# Patient Record
Sex: MALE | Race: WHITE | Employment: FULL TIME | URBAN - NONMETROPOLITAN AREA
[De-identification: names, ages, dates, MRNs, and addresses within clinical notes are randomized per-mention and may not be internally consistent; named-entity substitution may affect disease eponyms.]

---

## 2018-06-15 ENCOUNTER — OFFICE VISIT (OUTPATIENT)
Dept: FAMILY MEDICINE CLINIC | Age: 53
End: 2018-06-15
Payer: COMMERCIAL

## 2018-06-15 VITALS
BODY MASS INDEX: 28.14 KG/M2 | DIASTOLIC BLOOD PRESSURE: 74 MMHG | SYSTOLIC BLOOD PRESSURE: 132 MMHG | HEART RATE: 84 BPM | HEIGHT: 69 IN | TEMPERATURE: 98.7 F | WEIGHT: 190 LBS

## 2018-06-15 DIAGNOSIS — K59.09 OTHER CONSTIPATION: ICD-10-CM

## 2018-06-15 DIAGNOSIS — M54.50 ACUTE MIDLINE LOW BACK PAIN WITHOUT SCIATICA: ICD-10-CM

## 2018-06-15 DIAGNOSIS — J30.1 ACUTE SEASONAL ALLERGIC RHINITIS DUE TO POLLEN: Primary | ICD-10-CM

## 2018-06-15 PROCEDURE — G8419 CALC BMI OUT NRM PARAM NOF/U: HCPCS | Performed by: FAMILY MEDICINE

## 2018-06-15 PROCEDURE — G8427 DOCREV CUR MEDS BY ELIG CLIN: HCPCS | Performed by: FAMILY MEDICINE

## 2018-06-15 PROCEDURE — 4004F PT TOBACCO SCREEN RCVD TLK: CPT | Performed by: FAMILY MEDICINE

## 2018-06-15 PROCEDURE — 3017F COLORECTAL CA SCREEN DOC REV: CPT | Performed by: FAMILY MEDICINE

## 2018-06-15 PROCEDURE — 96372 THER/PROPH/DIAG INJ SC/IM: CPT | Performed by: FAMILY MEDICINE

## 2018-06-15 PROCEDURE — 99214 OFFICE O/P EST MOD 30 MIN: CPT | Performed by: FAMILY MEDICINE

## 2018-06-15 RX ORDER — FLUTICASONE PROPIONATE 50 MCG
1 SPRAY, SUSPENSION (ML) NASAL DAILY
Qty: 1 BOTTLE | Refills: 0 | Status: SHIPPED | OUTPATIENT
Start: 2018-06-15 | End: 2021-03-22 | Stop reason: ALTCHOICE

## 2018-06-15 RX ORDER — METHYLPREDNISOLONE ACETATE 80 MG/ML
80 INJECTION, SUSPENSION INTRA-ARTICULAR; INTRALESIONAL; INTRAMUSCULAR; SOFT TISSUE ONCE
Status: COMPLETED | OUTPATIENT
Start: 2018-06-15 | End: 2018-06-15

## 2018-06-15 RX ORDER — TIZANIDINE 4 MG/1
4 TABLET ORAL 3 TIMES DAILY
Qty: 15 TABLET | Refills: 0 | Status: SHIPPED | OUTPATIENT
Start: 2018-06-15 | End: 2018-07-03 | Stop reason: ALTCHOICE

## 2018-06-15 RX ADMIN — METHYLPREDNISOLONE ACETATE 80 MG: 80 INJECTION, SUSPENSION INTRA-ARTICULAR; INTRALESIONAL; INTRAMUSCULAR; SOFT TISSUE at 12:10

## 2018-06-15 ASSESSMENT — ENCOUNTER SYMPTOMS
DIARRHEA: 0
ABDOMINAL PAIN: 0
BACK PAIN: 1
CONSTIPATION: 1

## 2018-06-15 ASSESSMENT — PATIENT HEALTH QUESTIONNAIRE - PHQ9
1. LITTLE INTEREST OR PLEASURE IN DOING THINGS: 0
2. FEELING DOWN, DEPRESSED OR HOPELESS: 0
SUM OF ALL RESPONSES TO PHQ QUESTIONS 1-9: 0
SUM OF ALL RESPONSES TO PHQ9 QUESTIONS 1 & 2: 0

## 2018-07-03 ENCOUNTER — OFFICE VISIT (OUTPATIENT)
Dept: FAMILY MEDICINE CLINIC | Age: 53
End: 2018-07-03
Payer: COMMERCIAL

## 2018-07-03 VITALS
HEART RATE: 76 BPM | HEIGHT: 69 IN | BODY MASS INDEX: 27.16 KG/M2 | SYSTOLIC BLOOD PRESSURE: 116 MMHG | DIASTOLIC BLOOD PRESSURE: 64 MMHG | WEIGHT: 183.4 LBS

## 2018-07-03 DIAGNOSIS — M54.50 ACUTE MIDLINE LOW BACK PAIN WITHOUT SCIATICA: Primary | ICD-10-CM

## 2018-07-03 PROCEDURE — 4004F PT TOBACCO SCREEN RCVD TLK: CPT | Performed by: FAMILY MEDICINE

## 2018-07-03 PROCEDURE — 3017F COLORECTAL CA SCREEN DOC REV: CPT | Performed by: FAMILY MEDICINE

## 2018-07-03 PROCEDURE — G8427 DOCREV CUR MEDS BY ELIG CLIN: HCPCS | Performed by: FAMILY MEDICINE

## 2018-07-03 PROCEDURE — G8419 CALC BMI OUT NRM PARAM NOF/U: HCPCS | Performed by: FAMILY MEDICINE

## 2018-07-03 PROCEDURE — 99212 OFFICE O/P EST SF 10 MIN: CPT | Performed by: FAMILY MEDICINE

## 2018-07-03 NOTE — PROGRESS NOTES
No prescriptions requested or ordered in this encounter       All patient questions answered. Patient voiced understanding. Quality Measures    Body mass index is 27.08 kg/m². Elevated. Weight control planned discussed Healthy diet and regular exercise. BP: 116/64 Blood pressure is normal. Treatment plan consists of No treatment change needed.     No results found for: LDLCALC, LDLCHOLESTEROL, LDLDIRECT (goal LDL reduction with dx if diabetes is 50% LDL reduction)      PHQ Scores 6/15/2018 9/26/2016   PHQ2 Score 0 0   PHQ9 Score 0 0     Interpretation of Total Score Depression Severity: 1-4 = Minimal depression, 5-9 = Mild depression, 10-14 = Moderate depression, 15-19 = Moderately severe depression, 20-27 = Severe depression      Electronically signed by Viola Esteves MD on 7/3/2018 at 10:19 AM

## 2018-07-03 NOTE — PATIENT INSTRUCTIONS
hours. Put a thin cloth between the ice pack and your skin. · Take pain medicines exactly as directed. ¨ If the doctor gave you a prescription medicine for pain, take it as prescribed. ¨ If you are not taking a prescription pain medicine, ask your doctor if you can take an over-the-counter medicine. · Take short walks several times a day. You can start with 5 to 10 minutes, 3 or 4 times a day, and work up to longer walks. Walk on level surfaces and avoid hills and stairs until your back is better. · Return to work and other activities as soon as you can. Continued rest without activity is usually not good for your back. · To prevent future back pain, do exercises to stretch and strengthen your back and stomach. Learn how to use good posture, safe lifting techniques, and proper body mechanics. When should you call for help? Call your doctor now or seek immediate medical care if:    · You have new or worsening numbness in your legs.     · You have new or worsening weakness in your legs. (This could make it hard to stand up.)     · You lose control of your bladder or bowels.    Watch closely for changes in your health, and be sure to contact your doctor if:    · Your pain gets worse.     · You are not getting better after 2 weeks. Where can you learn more? Go to https://Common Ground.AudienceScience. org and sign in to your goBalto account. Enter O469 in the "[employer] box to learn more about \"Back Pain: Care Instructions. \"     If you do not have an account, please click on the \"Sign Up Now\" link. Current as of: November 29, 2017  Content Version: 11.6  © 8124-4044 [employer]. Care instructions adapted under license by [facility] ([facility]). If you have questions about a medical condition or this instruction, always ask your healthcare professional. [address] any warranty or liability for your use of this information.

## 2018-08-27 ENCOUNTER — TELEPHONE (OUTPATIENT)
Dept: FAMILY MEDICINE CLINIC | Age: 53
End: 2018-08-27

## 2021-03-17 ENCOUNTER — TELEPHONE (OUTPATIENT)
Dept: FAMILY MEDICINE CLINIC | Age: 56
End: 2021-03-17

## 2021-03-19 ENCOUNTER — OFFICE VISIT (OUTPATIENT)
Dept: FAMILY MEDICINE CLINIC | Age: 56
End: 2021-03-19
Payer: COMMERCIAL

## 2021-03-19 VITALS
OXYGEN SATURATION: 97 % | TEMPERATURE: 97.6 F | BODY MASS INDEX: 28.23 KG/M2 | HEART RATE: 58 BPM | WEIGHT: 190.6 LBS | HEIGHT: 69 IN | DIASTOLIC BLOOD PRESSURE: 82 MMHG | SYSTOLIC BLOOD PRESSURE: 120 MMHG

## 2021-03-19 DIAGNOSIS — F17.200 SMOKING: ICD-10-CM

## 2021-03-19 DIAGNOSIS — K40.90 LEFT INGUINAL HERNIA: Primary | ICD-10-CM

## 2021-03-19 PROCEDURE — 99213 OFFICE O/P EST LOW 20 MIN: CPT | Performed by: FAMILY MEDICINE

## 2021-03-19 ASSESSMENT — ENCOUNTER SYMPTOMS
DIARRHEA: 0
NAUSEA: 0
BLOOD IN STOOL: 0

## 2021-03-19 ASSESSMENT — PATIENT HEALTH QUESTIONNAIRE - PHQ9
2. FEELING DOWN, DEPRESSED OR HOPELESS: 0
SUM OF ALL RESPONSES TO PHQ9 QUESTIONS 1 & 2: 0
SUM OF ALL RESPONSES TO PHQ QUESTIONS 1-9: 0

## 2021-03-19 NOTE — PROGRESS NOTES
[facility]  [facility] MEDICINE  [address] [facility]  Dept: [phone]  Dept Fax: [phone]  Loc: [phone]  PROGRESS NOTE      Visit Date: 3/19/2021    Josiah Miles is a 60 y.o. male who presents today for:  Chief Complaint   Patient presents with    Hernia     left groin area       Subjective:  HPI    2.5 yr f/u    Left groin bulge. Started in nov 2020. Sometimes has pain. Does a lot of heavy lifting at work. Not seen y anyone else for this. No injury. Hx of right inguinal hernia repair. Works at [employer]. Review of Systems   Constitutional: Negative for chills and fever. Gastrointestinal: Negative for blood in stool, diarrhea and nausea. Genitourinary: Negative for scrotal swelling. There is no problem list on file for this patient. History reviewed. No pertinent past medical history. Past Surgical History:   Procedure Laterality Date    HERNIA REPAIR Right      Family History   Problem Relation Age of Onset   [facility] COPD Mother     Cancer Father         lung     Social History     Tobacco Use    Smoking status: Current Every Day Smoker     Packs/day: 0.75    Smokeless tobacco: Never Used   Substance Use Topics    Alcohol use: Not on file      Current Outpatient Medications   Medication Sig Dispense Refill    fluticasone (FLONASE) 50 MCG/ACT nasal spray 1 spray by Nasal route daily (Patient not taking: Reported on 3/19/2021) 1 Bottle 0     No current facility-administered medications for this visit.       Allergies   Allergen Reactions    Amoxicillin Hives     itch     Health Maintenance   Topic Date Due    Hepatitis C screen  Never done    Pneumococcal 0-64 years Vaccine (1 of 1 - PPSV23) Never done    HIV screen  Never done    COVID-19 Vaccine (1) Never done    Lipid screen  Never done    Diabetes screen  Never done    Shingles Vaccine (1 of 2) Never done    Colon cancer screen colonoscopy  Never done  Flu vaccine (1) 06/30/2021 (Originally 9/1/2020)    DTaP/Tdap/Td vaccine (2 - Td) 06/27/2021    Hepatitis A vaccine  Aged Out    Hepatitis B vaccine  Aged Out    Hib vaccine  Aged Out    Meningococcal (ACWY) vaccine  Aged Out       Objective:  /82 (Site: Left Upper Arm, Position: Sitting)   Pulse 58   Temp 97.6 °F (36.4 °C)   Ht 5' 9\" (1.753 m)   Wt 190 lb 9.6 oz (86.5 kg)   SpO2 97%   BMI 28.15 kg/m²   Physical Exam  Vitals signs reviewed. Constitutional:       General: He is not in acute distress. Appearance: He is not ill-appearing or toxic-appearing. Abdominal:      General: There is no distension. Palpations: Abdomen is soft. Tenderness: There is no abdominal tenderness. There is no guarding or rebound. Hernia: A hernia is present. Hernia is present in the left inguinal area. There is no hernia in the right inguinal area. Comments: Left inguinal hernia is reducible   Neurological:      Mental Status: He is alert. Mental status is at baseline. Psychiatric:         Mood and Affect: Mood normal.         Behavior: Behavior normal.         Impression/Plan:  1. Left inguinal hernia  New problem left inguinal hernia that is reducible. Will refer to general surgeon. If it becomes painful or, he should go to the ER or call office  - Guy Wick MD, General Surgery, [facility]    2. Smoking  Recommend cessation    Gets labs for DM and cholesterol screening through work. He wants colonoscopy and will talk with surgeon about it. They voiced understanding. All questions answered. They agreed with treatment plan. See patient instructions for any educational materials that may have been given. Discussed use, benefit, and side effects of prescribed medications. Reviewed health maintenance.     (Please note that portions of this note may have been completed with a voice recognition program.  Efforts were made to edit the dictation but occasionally words are mis-transcribed.)    Return in about 3 months (around 6/19/2021) for Well.       Electronically signed by Nitesh Solares MD on 3/19/2021 at 8:50 AM

## 2021-03-19 NOTE — PATIENT INSTRUCTIONS
Patient Education        Inguinal Hernia: Care Instructions  Your Care Instructions     An inguinal hernia occurs when tissue bulges through a weak spot in your groin area. You may see or feel a tender bulge in the groin or scrotum. You may also have pain, pressure or burning, or a feeling that something has \"given way. \"  Hernias are caused by a weakness in the belly wall. The bulge or discomfort may occur after heavy lifting, straining, or coughing. Hernias do not heal on their own, and they tend to get worse over time. If your hernia does not bother you, you most likely can wait to have surgery. Your hernia may get worse, but it may not. In some cases, hernias that are small and painless may never need to be repaired. Follow-up care is a key part of your treatment and safety. Be sure to make and go to all appointments, and call your doctor if you are having problems. It's also a good idea to know your test results and keep a list of the medicines you take. How can you care for yourself at home? · Take pain medicines exactly as directed. ? If the doctor gave you a prescription medicine for pain, take it as prescribed. ? If you are not taking a prescription pain medicine, ask your doctor if you can take an over-the-counter medicine. · Use proper lifting techniques, and avoid heavy lifting if you can. To lift things more safely, bend your knees and let your arms and legs do the work. Keep your back straight, and do not bend over at the waist. Keep the load as close to your body as you can. Move your feet instead of turning or twisting your body. · Lose weight if you are overweight. · Include fruits, vegetables, legumes, and whole grains in your diet each day. These foods are high in fiber and will make it easier to avoid straining during bowel movements. · Do not smoke. Smoking can cause coughing, which can cause your hernia to bulge.  If you need help quitting, talk to your doctor about stop-smoking programs and medicines. These can increase your chances of quitting for good. When should you call for help? Call your doctor now or seek immediate medical care if:    · You have new or worse belly pain.     · You are vomiting.     · You cannot pass stools or gas.     · You cannot push the hernia back into place with gentle pressure when you are lying down.     · The area over the hernia turns red or becomes tender. Watch closely for changes in your health, and be sure to contact your doctor if you have any problems. Where can you learn more? Go to https://[employer].Wibbitz. org and sign in to your [employer] account. Enter J021 in the [employer] box to learn more about \"Inguinal Hernia: Care Instructions. \"     If you do not have an account, please click on the \"Sign Up Now\" link. Current as of: April 15, 2020               Content Version: 12.8  © 8665-7934 [employer], [employer]. Care instructions adapted under license by [facility] ([facility]). If you have questions about a medical condition or this instruction, always ask your healthcare professional. [address] any warranty or liability for your use of this information.

## 2021-03-22 ENCOUNTER — OFFICE VISIT (OUTPATIENT)
Dept: SURGERY | Age: 56
End: 2021-03-22
Payer: COMMERCIAL

## 2021-03-22 VITALS
BODY MASS INDEX: 27.74 KG/M2 | TEMPERATURE: 98.3 F | WEIGHT: 187.3 LBS | OXYGEN SATURATION: 98 % | HEIGHT: 69 IN | HEART RATE: 89 BPM | DIASTOLIC BLOOD PRESSURE: 60 MMHG | SYSTOLIC BLOOD PRESSURE: 130 MMHG | RESPIRATION RATE: 18 BRPM

## 2021-03-22 DIAGNOSIS — K40.90 LEFT INGUINAL HERNIA: Primary | ICD-10-CM

## 2021-03-22 DIAGNOSIS — Z01.818 PRE-OP TESTING: ICD-10-CM

## 2021-03-22 PROCEDURE — 99204 OFFICE O/P NEW MOD 45 MIN: CPT | Performed by: SURGERY

## 2021-03-22 ASSESSMENT — ENCOUNTER SYMPTOMS
WHEEZING: 0
VOICE CHANGE: 0
BLOOD IN STOOL: 0
STRIDOR: 0
CHEST TIGHTNESS: 0
SHORTNESS OF BREATH: 0
RHINORRHEA: 0
ABDOMINAL PAIN: 0
EYE DISCHARGE: 0
FACIAL SWELLING: 0
VOMITING: 0
EYE REDNESS: 0
CONSTIPATION: 0
APNEA: 0
TROUBLE SWALLOWING: 0
EYE ITCHING: 0
NAUSEA: 0
ABDOMINAL DISTENTION: 0
COLOR CHANGE: 0
ALLERGIC/IMMUNOLOGIC NEGATIVE: 1
ANAL BLEEDING: 0
RECTAL PAIN: 0
CHOKING: 0
DIARRHEA: 0
EYE PAIN: 0
PHOTOPHOBIA: 0
BACK PAIN: 0
SINUS PRESSURE: 0
COUGH: 0
SORE THROAT: 0

## 2021-03-22 NOTE — LETTER
[address] Surgery  [place] [address] [address] [facility] [address]  Phone: [phone]  Fax: [phone]    Junie Metcalf MD    March 22, 2021     Patient: Cristin Lilly   MR Number: [id number]   YOB: 1965   Date of Visit: 3/22/2021     Dear Dr. Dora Watkins: Thank you for the request for consultation on Cristin Lilly. Below are the relevant portions of my assessment and plan of care. ASSESSMENT:  1. Left inguinal hernia    PLAN:  1. Schedule Fran for robotic repair left inguinal hernia with mesh; robotic repair recurrent right inguinal hernia with mesh if identified intraoperatively. 2. He will undergo pre-operative clearance per anesthesia guidelines with risk factors listed under the past medical history diagnosis & problem list.  3. The risks, benefits and alternatives were discussed with KARL including non-operative management. The pros and cons of robotic, laparoscopic and open techniques were discussed. The pros and cons of mesh insertion were discussed. All questions answered. He understands and wishes to proceed with surgical intervention. 4. Restrictions discussed with KARL and he expresses understanding. 5. He is advised to call back directly if there are further questions/concerns, or if his symptoms worsen prior to surgery. If you have questions, please do not hesitate to call me. I look forward to following KARL along with you.     Sincerely,    Ailyn Miner MD

## 2021-03-22 NOTE — PROGRESS NOTES
Masoud Boone (:  1965)     ASSESSMENT:  1. Left inguinal hernia    PLAN:  1. Schedule Fran for robotic repair left inguinal hernia with mesh; robotic repair recurrent right inguinal hernia with mesh if identified intraoperatively. 2. He will undergo pre-operative clearance per anesthesia guidelines with risk factors listed under the past medical history diagnosis & problem list.  3. The risks, benefits and alternatives were discussed with Cem Mclaughlin including non-operative management. The pros and cons of robotic, laparoscopic and open techniques were discussed. The pros and cons of mesh insertion were discussed. All questions answered. He understands and wishes to proceed with surgical intervention. 4. Restrictions discussed with Cem Mclaughlin and he expresses understanding. 5. He is advised to call back directly if there are further questions/concerns, or if his symptoms worsen prior to surgery. SUBJECTIVE/OBJECTIVE:    Chief Complaint   Patient presents with    Surgical Consult     New patient-referred by Dr Blake Sanchez inguinal hernia     HPI  Cem Mclaughlin is a 69-year-old male who presents for initial evaluation secondary to a left groin bulge with discomfort. Found to have a left inguinal hernia by his PCP. States he noticed it back in November. Not sure when it exactly happened or what he was doing but started to have left groin discomfort that was mild to moderate. Noticed the bulge. The bulge will reduce but then comes back out the more he is active throughout the day. Only mild discomfort. No real radiation of the discomfort. No severe sharp shooting pains. Seems to be worse with increased activity, lifting and bending over. Worsens throughout the day when he is active. No change with bowel function. No hematochezia or melena. No new urinary complaints. No generalized abdominal pain. No chest pain or shortness of breath. No fever, chills or sweats.   History of right inguinal hernia repair with mesh several years ago. No discomfort or bulge noted in the right groin. Review of Systems   Constitutional: Negative for activity change, appetite change, chills, diaphoresis, fatigue, fever and unexpected weight change. HENT: Negative for congestion, dental problem, drooling, ear discharge, ear pain, facial swelling, hearing loss, mouth sores, nosebleeds, postnasal drip, rhinorrhea, sinus pressure, sneezing, sore throat, tinnitus, trouble swallowing and voice change. Eyes: Negative for photophobia, pain, discharge, redness, itching and visual disturbance. Respiratory: Negative for apnea, cough, choking, chest tightness, shortness of breath, wheezing and stridor. Cardiovascular: Negative for chest pain, palpitations and leg swelling. Gastrointestinal: Negative for abdominal distention, abdominal pain, anal bleeding, blood in stool, constipation, diarrhea, nausea, rectal pain and vomiting. Endocrine: Negative. Genitourinary: Negative for decreased urine volume, difficulty urinating, discharge, dysuria, enuresis, flank pain, frequency, genital sores, hematuria, penile pain, penile swelling, scrotal swelling, testicular pain and urgency. Musculoskeletal: Negative for arthralgias, back pain, gait problem, joint swelling, myalgias, neck pain and neck stiffness. Skin: Negative for color change, pallor, rash and wound. Allergic/Immunologic: Negative. Neurological: Negative for dizziness, tremors, seizures, syncope, facial asymmetry, speech difficulty, weakness, light-headedness, numbness and headaches. Hematological: Negative for adenopathy. Does not bruise/bleed easily. Psychiatric/Behavioral: Negative for agitation, behavioral problems, confusion, decreased concentration, dysphoric mood, hallucinations, self-injury, sleep disturbance and suicidal ideas. The patient is not nervous/anxious and is not hyperactive.         Past Medical History:   Diagnosis Date    Inguinal hernia 03/2021    left       Past Surgical History:   Procedure Laterality Date    HERNIA REPAIR Right     age 20 Dr Neville Riedel       No current outpatient medications on file. No current facility-administered medications for this visit.         Allergies   Allergen Reactions    Amoxicillin Hives     itch       Family History   Problem Relation Age of Onset    COPD Mother    Tamara Jerome Cancer Father         lung    No Known Problems Sister     No Known Problems Brother     No Known Problems Maternal Grandmother     No Known Problems Maternal Grandfather     No Known Problems Paternal Grandmother     No Known Problems Paternal Grandfather     No Known Problems Sister     No Known Problems Sister     No Known Problems Sister        Social History     Socioeconomic History    Marital status:      Spouse name: Not on file    Number of children: Not on file    Years of education: Not on file    Highest education level: Not on file   Occupational History    Not on file   Social Needs    Financial resource strain: Not on file    Food insecurity     Worry: Not on file     Inability: Not on file    Transportation needs     Medical: Not on file     Non-medical: Not on file   Tobacco Use    Smoking status: Current Every Day Smoker     Packs/day: 0.75    Smokeless tobacco: Never Used   Substance and Sexual Activity    Alcohol use: Not on file    Drug use: Not on file    Sexual activity: Not on file   Lifestyle    Physical activity     Days per week: Not on file     Minutes per session: Not on file    Stress: Not on file   Relationships    Social connections     Talks on phone: Not on file     Gets together: Not on file     Attends [employer] service: Not on file     Active member of club or organization: Not on file     Attends meetings of clubs or organizations: Not on file     Relationship status: Not on file    Intimate partner violence     Fear of current or ex partner: Not on file     Emotionally abused: Not on file     Physically abused: Not on file     Forced sexual activity: Not on file   Other Topics Concern    Not on file   Social History Narrative    Not on file     Vitals:    03/22/21 1108   BP: 130/60   Site: Right Upper Arm   Position: Sitting   Cuff Size: Medium Adult   Pulse: 89   Resp: 18   Temp: 98.3 °F (36.8 °C)   TempSrc: Temporal   SpO2: 98%   Weight: 187 lb 4.8 oz (85 kg)   Height: 5' 9\" (1.753 m)     Body mass index is 27.66 kg/m². Wt Readings from Last 3 Encounters:   03/22/21 187 lb 4.8 oz (85 kg)   03/19/21 190 lb 9.6 oz (86.5 kg)   07/03/18 183 lb 6.4 oz (83.2 kg)     Physical Exam  Vitals signs reviewed. Constitutional:       General: He is not in acute distress. Appearance: He is well-developed. He is not diaphoretic. HENT:      Head: Normocephalic and atraumatic. Right Ear: External ear normal.      Left Ear: External ear normal.      Nose: Nose normal.   Eyes:      General: No scleral icterus. Right eye: No discharge. Left eye: No discharge. Conjunctiva/sclera: Conjunctivae normal.   Neck:      Musculoskeletal: Normal range of motion and neck supple. Cardiovascular:      Rate and Rhythm: Normal rate and regular rhythm. Heart sounds: Normal heart sounds. Pulmonary:      Effort: Pulmonary effort is normal. No respiratory distress. Breath sounds: Normal breath sounds. No wheezing or rales. Chest:      Chest wall: No tenderness. Abdominal:      General: Bowel sounds are normal. There is no distension. Palpations: Abdomen is soft. There is no mass. Tenderness: There is no abdominal tenderness. There is no guarding or rebound. Hernia: A hernia is present. Hernia is present in the left inguinal area. There is no hernia in the right inguinal area. Musculoskeletal: Normal range of motion. General: No tenderness. Skin:     General: Skin is warm and dry. Coloration: Skin is not pale.       Findings: No erythema or rash. Neurological:      Mental Status: He is alert and oriented to person, place, and time. Cranial Nerves: No cranial nerve deficit. Psychiatric:         Behavior: Behavior normal.         Thought Content: Thought content normal.         Judgment: Judgment normal.     No results found for: NA, K, CL, CO2  No results found for: CREATININE  No results found for: WBC, HGB, HCT, MCV, PLT    Imaging - none    There is no problem list on file for this patient. An electronic signature was used to authenticate this note.     --Demario Ruiz MD

## 2021-04-05 ENCOUNTER — TELEPHONE (OUTPATIENT)
Dept: FAMILY MEDICINE CLINIC | Age: 56
End: 2021-04-05

## 2021-04-09 ENCOUNTER — PREP FOR PROCEDURE (OUTPATIENT)
Dept: SURGERY | Age: 56
End: 2021-04-09

## 2021-04-09 RX ORDER — SODIUM CHLORIDE 9 MG/ML
INJECTION, SOLUTION INTRAVENOUS CONTINUOUS
Status: CANCELLED | OUTPATIENT
Start: 2021-04-09

## 2021-04-09 RX ORDER — CIPROFLOXACIN 2 MG/ML
400 INJECTION, SOLUTION INTRAVENOUS
Status: CANCELLED | OUTPATIENT
Start: 2021-04-09

## 2021-04-23 ENCOUNTER — TELEPHONE (OUTPATIENT)
Dept: SURGERY | Age: 56
End: 2021-04-23

## 2022-06-07 ENCOUNTER — OFFICE VISIT (OUTPATIENT)
Dept: FAMILY MEDICINE CLINIC | Age: 57
End: 2022-06-07
Payer: COMMERCIAL

## 2022-06-07 ENCOUNTER — TELEPHONE (OUTPATIENT)
Dept: FAMILY MEDICINE CLINIC | Age: 57
End: 2022-06-07

## 2022-06-07 VITALS
SYSTOLIC BLOOD PRESSURE: 136 MMHG | HEIGHT: 69 IN | HEART RATE: 68 BPM | WEIGHT: 186.4 LBS | DIASTOLIC BLOOD PRESSURE: 78 MMHG | TEMPERATURE: 97.7 F | RESPIRATION RATE: 16 BRPM | OXYGEN SATURATION: 99 % | BODY MASS INDEX: 27.61 KG/M2

## 2022-06-07 DIAGNOSIS — S01.01XS LACERATION OF SCALP WITHOUT FOREIGN BODY, SEQUELA: ICD-10-CM

## 2022-06-07 DIAGNOSIS — M62.838 MUSCLE SPASM: Primary | ICD-10-CM

## 2022-06-07 DIAGNOSIS — L98.9 SCALP LESION: ICD-10-CM

## 2022-06-07 PROCEDURE — 99213 OFFICE O/P EST LOW 20 MIN: CPT | Performed by: FAMILY MEDICINE

## 2022-06-07 RX ORDER — TIZANIDINE HYDROCHLORIDE 2 MG/1
2 CAPSULE, GELATIN COATED ORAL 3 TIMES DAILY PRN
Qty: 30 CAPSULE | Refills: 0 | Status: SHIPPED | OUTPATIENT
Start: 2022-06-07 | End: 2022-06-17

## 2022-06-07 SDOH — ECONOMIC STABILITY: FOOD INSECURITY: WITHIN THE PAST 12 MONTHS, YOU WORRIED THAT YOUR FOOD WOULD RUN OUT BEFORE YOU GOT MONEY TO BUY MORE.: NEVER TRUE

## 2022-06-07 SDOH — ECONOMIC STABILITY: FOOD INSECURITY: WITHIN THE PAST 12 MONTHS, THE FOOD YOU BOUGHT JUST DIDN'T LAST AND YOU DIDN'T HAVE MONEY TO GET MORE.: NEVER TRUE

## 2022-06-07 ASSESSMENT — PATIENT HEALTH QUESTIONNAIRE - PHQ9
SUM OF ALL RESPONSES TO PHQ9 QUESTIONS 1 & 2: 0
1. LITTLE INTEREST OR PLEASURE IN DOING THINGS: 0
SUM OF ALL RESPONSES TO PHQ QUESTIONS 1-9: 0
2. FEELING DOWN, DEPRESSED OR HOPELESS: 0

## 2022-06-07 ASSESSMENT — SOCIAL DETERMINANTS OF HEALTH (SDOH): HOW HARD IS IT FOR YOU TO PAY FOR THE VERY BASICS LIKE FOOD, HOUSING, MEDICAL CARE, AND HEATING?: NOT HARD AT ALL

## 2022-06-07 NOTE — TELEPHONE ENCOUNTER
Called pt to let him know I got him scheduled for 6/21/2022 at 11 am with Dr Chase Vegas. Pt verbalized understanding.

## 2022-06-07 NOTE — PROGRESS NOTES
Deepti Brannon is a 60 y.o. male who presents today for:  Chief Complaint   Patient presents with   [place] Fall     fell friday from a ladder saturday and and sunday couldn't move, tried going to work last night but left becuase back hurt too much       HPI:   Deepti Brannon is 60 y.o. who presents today for back pain. He fell from a ladder, landed on his feet from 9' up on Friday 6/3/22. Pain in right flank with forward flexion of back. No radiation of pain down leg. No numbness or tingling. Sharp pain with movement. Took aspirin w/o improvement. Initially had difficulty raising right arm. Slept on the floor. Feeling better today. Pain currently a 4/10. Declines work note. Nonhealing skin lesion behind right ear, previous infection several years ago requiring I&D per patient by previous PCP. Has not healed since then, occasionally drains fluid. Objective:     Vitals:    06/07/22 0943   BP: 136/78   Site: Left Upper Arm   Position: Sitting   Pulse: 68   Resp: 16   Temp: 97.7 °F (36.5 °C)   SpO2: 99%   Weight: 186 lb 6.4 oz (84.6 kg)   Height: 5' 9\" (1.753 m)       Wt Readings from Last 3 Encounters:   06/07/22 186 lb 6.4 oz (84.6 kg)   03/22/21 187 lb 4.8 oz (85 kg)   03/19/21 190 lb 9.6 oz (86.5 kg)       BP Readings from Last 3 Encounters:   06/07/22 136/78   03/22/21 130/60   03/19/21 120/82       Review of Systems    Physical Exam  Vitals and nursing note reviewed. Constitutional:       General: He is not in acute distress. Appearance: He is well-developed. He is not diaphoretic. HENT:      Head: Normocephalic and atraumatic. Right Ear: External ear normal.      Left Ear: External ear normal.      Nose: Nose normal.   Eyes:      General: No scleral icterus. Right eye: No discharge. Left eye: No discharge. Conjunctiva/sclera: Conjunctivae normal.   Cardiovascular:      Rate and Rhythm: Normal rate and regular rhythm. Heart sounds: Normal heart sounds.  No murmur heard.      Pulmonary:      Effort: Pulmonary effort is normal.      Breath sounds: Normal breath sounds. Abdominal:      Palpations: Abdomen is soft. Tenderness: There is no abdominal tenderness. Musculoskeletal:         General: Tenderness (Right rib 9 and 10 posteriorly ) present. No deformity. Normal range of motion. Cervical back: Normal range of motion. Comments: No midline back tenderness  Negative SLR  Sensation and strength fully intact  Gait WNL    Skin:     General: Skin is warm and dry. Findings: Lesion (Open wound behind right ear, with granulomatous tissue surrounding it, approx 1x2 cm with central slit like opening. No redness or discharge currently. ) present. No erythema or rash. Neurological:      Mental Status: He is alert and oriented to person, place, and time. Psychiatric:         Behavior: Behavior normal.         Thought Content: Thought content normal.         Judgment: Judgment normal.         Immunization History   Administered Date(s) Administered    Tdap (Boostrix, Adacel) 06/27/2011       Health Maintenance Due   Topic Date Due    COVID-19 Vaccine (1) Never done    Pneumococcal 0-64 years Vaccine (1 - PCV) Never done    HIV screen  Never done    Hepatitis C screen  Never done    Diabetes screen  Never done    Lipids  Never done    Prostate Specific Antigen (PSA) Screening or Monitoring  Never done    Colorectal Cancer Screen  Never done    Shingles vaccine (1 of 2) Never done    DTaP/Tdap/Td vaccine (2 - Td or Tdap) 06/27/2021    Depression Screen  03/19/2022          Food Insecurity: No Food Insecurity    Worried About Running Out of Food in the Last Year: Never true    Robert of Food in the Last Year: Never true       Assessment / Plan:      Diagnosis Orders   1. Muscle spasm  tiZANidine (ZANAFLEX) 2 MG capsule   2. Laceration of scalp without foreign body, sequela  Pieter Ha MD, Plastic Surgery, [facility]   3.  Scalp lesion  [facility]     Continue supportive care for back. Zanaflex 2 mg q8 PRN, counseled to take at night first to see how it was tolerated. Overall, improving since onset of symptoms. No imaging indicated at this time. Patient to call back if worsening or no improvement. Refer to plastics for non healing skin lesion behind right ear, malignancy considered given nonhealing for several years. Return in 6-8 weeks for physical        Return in about 2 months (around 8/7/2022) for physical.          Medications Prescribed:  Orders Placed This Encounter   Medications    tiZANidine (ZANAFLEX) 2 MG capsule     Sig: Take 1 capsule by mouth 3 times daily as needed for Muscle spasms     Dispense:  30 capsule     Refill:  0       No future appointments. Patient given educational materials - see patient instructions. Discussed use, benefit, and sideeffects of prescribed medications. All patient questions answered. Pt voiced understanding. Reviewed health maintenance. Instructed to continue current medications, diet and exercise. Patient agreed with treatment plan. Follow up as directed.      Electronically signed by Brian Andrade DO on 6/7/2022 at 10:16 AM

## 2022-06-07 NOTE — PROGRESS NOTES
Attending Supervising Physician's Attestation Statement  I performed a history and physical examination on the patient and discussed the management with the resident physician. I reviewed and agree with the findings and plan as documented in her note . Diagnosis Orders   1. Muscle spasm  tiZANidine (ZANAFLEX) 2 MG capsule   2. Laceration of scalp without foreign body, sequela  Syeda Bajwa MD, Plastic Surgery, [address] [place] [address]   3.  Scalp lesion  Syeda Bajwa MD, Plastic Surgery, [address] [place] [address]         Electronically signed by Sana Lay MD on 6/7/22 at 10:52 AM EDT

## 2024-04-12 ENCOUNTER — APPOINTMENT (OUTPATIENT)
Dept: GENERAL RADIOLOGY | Age: 59
DRG: 487 | End: 2024-04-12
Payer: COMMERCIAL

## 2024-04-12 ENCOUNTER — HOSPITAL ENCOUNTER (INPATIENT)
Age: 59
LOS: 5 days | Discharge: HOME | DRG: 487 | End: 2024-04-20
Attending: EMERGENCY MEDICINE | Admitting: EMERGENCY MEDICINE
Payer: COMMERCIAL

## 2024-04-12 DIAGNOSIS — M00.9 SEPTIC ARTHRITIS, DUE TO UNSPECIFIED ORGANISM, SEPTIC ARTHRITIS OF UNSPECIFIED LOCATION (HCC): ICD-10-CM

## 2024-04-12 DIAGNOSIS — L03.116 CELLULITIS OF LEFT LOWER EXTREMITY: ICD-10-CM

## 2024-04-12 DIAGNOSIS — I82.4Y2 ACUTE DEEP VEIN THROMBOSIS (DVT) OF PROXIMAL VEIN OF LEFT LOWER EXTREMITY (HCC): Primary | ICD-10-CM

## 2024-04-12 LAB
BASOPHILS ABSOLUTE: 0 THOU/MM3 (ref 0–0.1)
BASOPHILS NFR BLD AUTO: 0.2 %
D DIMER PPP IA.FEU-MCNC: 6512 NG/ML FEU (ref 0–500)
DEPRECATED RDW RBC AUTO: 49.4 FL (ref 35–45)
EOSINOPHIL NFR BLD AUTO: 0.3 %
EOSINOPHILS ABSOLUTE: 0 THOU/MM3 (ref 0–0.4)
ERYTHROCYTE [DISTWIDTH] IN BLOOD BY AUTOMATED COUNT: 14 % (ref 11.5–14.5)
HCT VFR BLD AUTO: 41.4 % (ref 42–52)
HGB BLD-MCNC: 14.1 GM/DL (ref 14–18)
IMM GRANULOCYTES # BLD AUTO: 0.1 THOU/MM3 (ref 0–0.07)
IMM GRANULOCYTES NFR BLD AUTO: 1 %
LYMPHOCYTES ABSOLUTE: 0.9 THOU/MM3 (ref 1–4.8)
LYMPHOCYTES NFR BLD AUTO: 8.4 %
MCH RBC QN AUTO: 33 PG (ref 26–33)
MCHC RBC AUTO-ENTMCNC: 34.1 GM/DL (ref 32.2–35.5)
MCV RBC AUTO: 97 FL (ref 80–94)
MONOCYTES ABSOLUTE: 1.4 THOU/MM3 (ref 0.4–1.3)
MONOCYTES NFR BLD AUTO: 13.5 %
NEUTROPHILS NFR BLD AUTO: 76.6 %
NRBC BLD AUTO-RTO: 0 /100 WBC
PLATELET # BLD AUTO: 342 THOU/MM3 (ref 130–400)
PMV BLD AUTO: 9.8 FL (ref 9.4–12.4)
RBC # BLD AUTO: 4.27 MILL/MM3 (ref 4.7–6.1)
REASON FOR REJECTION: NORMAL
REJECTED TEST: NORMAL
SEGMENTED NEUTROPHILS ABSOLUTE COUNT: 7.9 THOU/MM3 (ref 1.8–7.7)
WBC # BLD AUTO: 10.3 THOU/MM3 (ref 4.8–10.8)

## 2024-04-12 PROCEDURE — 85610 PROTHROMBIN TIME: CPT

## 2024-04-12 PROCEDURE — 6360000002 HC RX W HCPCS: Performed by: EMERGENCY MEDICINE

## 2024-04-12 PROCEDURE — 85730 THROMBOPLASTIN TIME PARTIAL: CPT

## 2024-04-12 PROCEDURE — 36415 COLL VENOUS BLD VENIPUNCTURE: CPT

## 2024-04-12 PROCEDURE — 96375 TX/PRO/DX INJ NEW DRUG ADDON: CPT

## 2024-04-12 PROCEDURE — 99285 EMERGENCY DEPT VISIT HI MDM: CPT

## 2024-04-12 PROCEDURE — 96361 HYDRATE IV INFUSION ADD-ON: CPT

## 2024-04-12 PROCEDURE — 87040 BLOOD CULTURE FOR BACTERIA: CPT

## 2024-04-12 PROCEDURE — 85520 HEPARIN ASSAY: CPT

## 2024-04-12 PROCEDURE — 73590 X-RAY EXAM OF LOWER LEG: CPT

## 2024-04-12 PROCEDURE — 96374 THER/PROPH/DIAG INJ IV PUSH: CPT

## 2024-04-12 PROCEDURE — 86140 C-REACTIVE PROTEIN: CPT

## 2024-04-12 PROCEDURE — 80048 BASIC METABOLIC PNL TOTAL CA: CPT

## 2024-04-12 PROCEDURE — 85379 FIBRIN DEGRADATION QUANT: CPT

## 2024-04-12 PROCEDURE — 73564 X-RAY EXAM KNEE 4 OR MORE: CPT

## 2024-04-12 PROCEDURE — 85025 COMPLETE CBC W/AUTO DIFF WBC: CPT

## 2024-04-12 PROCEDURE — 2580000003 HC RX 258: Performed by: EMERGENCY MEDICINE

## 2024-04-12 RX ORDER — HEPARIN SODIUM 10000 [USP'U]/100ML
5-32 INJECTION, SOLUTION INTRAVENOUS CONTINUOUS
Status: DISCONTINUED | OUTPATIENT
Start: 2024-04-13 | End: 2024-04-19

## 2024-04-12 RX ORDER — MORPHINE SULFATE 4 MG/ML
4 INJECTION, SOLUTION INTRAMUSCULAR; INTRAVENOUS
Status: COMPLETED | OUTPATIENT
Start: 2024-04-12 | End: 2024-04-12

## 2024-04-12 RX ORDER — 0.9 % SODIUM CHLORIDE 0.9 %
1000 INTRAVENOUS SOLUTION INTRAVENOUS ONCE
Status: COMPLETED | OUTPATIENT
Start: 2024-04-12 | End: 2024-04-12

## 2024-04-12 RX ORDER — HEPARIN SODIUM 1000 [USP'U]/ML
40 INJECTION, SOLUTION INTRAVENOUS; SUBCUTANEOUS PRN
Status: DISCONTINUED | OUTPATIENT
Start: 2024-04-12 | End: 2024-04-20 | Stop reason: HOSPADM

## 2024-04-12 RX ORDER — HEPARIN SODIUM 1000 [USP'U]/ML
80 INJECTION, SOLUTION INTRAVENOUS; SUBCUTANEOUS ONCE
Status: COMPLETED | OUTPATIENT
Start: 2024-04-13 | End: 2024-04-13

## 2024-04-12 RX ORDER — HEPARIN SODIUM 1000 [USP'U]/ML
80 INJECTION, SOLUTION INTRAVENOUS; SUBCUTANEOUS PRN
Status: DISCONTINUED | OUTPATIENT
Start: 2024-04-12 | End: 2024-04-20 | Stop reason: HOSPADM

## 2024-04-12 RX ORDER — CEPHALEXIN 250 MG/1
500 CAPSULE ORAL ONCE
Status: COMPLETED | OUTPATIENT
Start: 2024-04-12 | End: 2024-04-13

## 2024-04-12 RX ORDER — ONDANSETRON 2 MG/ML
4 INJECTION INTRAMUSCULAR; INTRAVENOUS ONCE
Status: COMPLETED | OUTPATIENT
Start: 2024-04-12 | End: 2024-04-12

## 2024-04-12 RX ADMIN — MORPHINE SULFATE 4 MG: 4 INJECTION, SOLUTION INTRAMUSCULAR; INTRAVENOUS at 22:46

## 2024-04-12 RX ADMIN — ONDANSETRON 4 MG: 2 INJECTION INTRAMUSCULAR; INTRAVENOUS at 22:45

## 2024-04-12 RX ADMIN — SODIUM CHLORIDE 1000 ML: 9 INJECTION, SOLUTION INTRAVENOUS at 22:48

## 2024-04-13 ENCOUNTER — APPOINTMENT (OUTPATIENT)
Dept: INTERVENTIONAL RADIOLOGY/VASCULAR | Age: 59
DRG: 487 | End: 2024-04-13
Payer: COMMERCIAL

## 2024-04-13 PROBLEM — M79.605 LEFT LEG PAIN: Status: ACTIVE | Noted: 2024-04-13

## 2024-04-13 LAB
ANION GAP SERPL CALC-SCNC: 13 MEQ/L (ref 8–16)
ANION GAP SERPL CALC-SCNC: 14 MEQ/L (ref 8–16)
APTT PPP: 31.2 SECONDS (ref 22–38)
BUN SERPL-MCNC: 26 MG/DL (ref 7–22)
BUN SERPL-MCNC: 28 MG/DL (ref 7–22)
CALCIUM SERPL-MCNC: 8.5 MG/DL (ref 8.5–10.5)
CALCIUM SERPL-MCNC: 8.8 MG/DL (ref 8.5–10.5)
CHLORIDE SERPL-SCNC: 97 MEQ/L (ref 98–111)
CHLORIDE SERPL-SCNC: 97 MEQ/L (ref 98–111)
CO2 SERPL-SCNC: 20 MEQ/L (ref 23–33)
CO2 SERPL-SCNC: 23 MEQ/L (ref 23–33)
CREAT SERPL-MCNC: 0.9 MG/DL (ref 0.4–1.2)
CREAT SERPL-MCNC: 0.9 MG/DL (ref 0.4–1.2)
CRP SERPL-MCNC: 34.61 MG/DL (ref 0–1)
ERYTHROCYTE [SEDIMENTATION RATE] IN BLOOD BY WESTERGREN METHOD: 95 MM/HR (ref 0–10)
GFR SERPL CREATININE-BSD FRML MDRD: > 90 ML/MIN/1.73M2
GFR SERPL CREATININE-BSD FRML MDRD: > 90 ML/MIN/1.73M2
GLUCOSE SERPL-MCNC: 110 MG/DL (ref 70–108)
GLUCOSE SERPL-MCNC: 114 MG/DL (ref 70–108)
HEPARIN UNFRACTIONATED: 0.07 U/ML (ref 0.3–0.7)
HEPARIN UNFRACTIONATED: 0.1 U/ML (ref 0.3–0.7)
HEPARIN UNFRACTIONATED: 0.11 U/ML (ref 0.3–0.7)
HEPARIN UNFRACTIONATED: < 0.04 U/ML (ref 0.3–0.7)
INR PPP: 1.14 (ref 0.85–1.13)
LACTATE SERPL-SCNC: 0.8 MMOL/L (ref 0.5–2)
OSMOLALITY SERPL CALC.SUM OF ELEC: 268.8 MOSMOL/KG (ref 275–300)
OSMOLALITY SERPL CALC.SUM OF ELEC: 272 MOSMOL/KG (ref 275–300)
POTASSIUM SERPL-SCNC: 4.5 MEQ/L (ref 3.5–5.2)
POTASSIUM SERPL-SCNC: 4.8 MEQ/L (ref 3.5–5.2)
PROCALCITONIN SERPL IA-MCNC: 0.6 NG/ML (ref 0.01–0.09)
SODIUM SERPL-SCNC: 131 MEQ/L (ref 135–145)
SODIUM SERPL-SCNC: 133 MEQ/L (ref 135–145)

## 2024-04-13 PROCEDURE — 80048 BASIC METABOLIC PNL TOTAL CA: CPT

## 2024-04-13 PROCEDURE — 84145 PROCALCITONIN (PCT): CPT

## 2024-04-13 PROCEDURE — 96366 THER/PROPH/DIAG IV INF ADDON: CPT

## 2024-04-13 PROCEDURE — 2580000003 HC RX 258

## 2024-04-13 PROCEDURE — 6360000002 HC RX W HCPCS

## 2024-04-13 PROCEDURE — 85520 HEPARIN ASSAY: CPT

## 2024-04-13 PROCEDURE — 83605 ASSAY OF LACTIC ACID: CPT

## 2024-04-13 PROCEDURE — 6360000002 HC RX W HCPCS: Performed by: EMERGENCY MEDICINE

## 2024-04-13 PROCEDURE — 93005 ELECTROCARDIOGRAM TRACING: CPT

## 2024-04-13 PROCEDURE — 6370000000 HC RX 637 (ALT 250 FOR IP)

## 2024-04-13 PROCEDURE — 6370000000 HC RX 637 (ALT 250 FOR IP): Performed by: EMERGENCY MEDICINE

## 2024-04-13 PROCEDURE — 99254 IP/OBS CNSLTJ NEW/EST MOD 60: CPT

## 2024-04-13 PROCEDURE — 96376 TX/PRO/DX INJ SAME DRUG ADON: CPT

## 2024-04-13 PROCEDURE — G0378 HOSPITAL OBSERVATION PER HR: HCPCS

## 2024-04-13 PROCEDURE — 6370000000 HC RX 637 (ALT 250 FOR IP): Performed by: PHYSICIAN ASSISTANT

## 2024-04-13 PROCEDURE — 85651 RBC SED RATE NONAUTOMATED: CPT

## 2024-04-13 PROCEDURE — 96365 THER/PROPH/DIAG IV INF INIT: CPT

## 2024-04-13 PROCEDURE — 36415 COLL VENOUS BLD VENIPUNCTURE: CPT

## 2024-04-13 PROCEDURE — 87641 MR-STAPH DNA AMP PROBE: CPT

## 2024-04-13 RX ORDER — MORPHINE SULFATE 2 MG/ML
2 INJECTION, SOLUTION INTRAMUSCULAR; INTRAVENOUS
Status: DISCONTINUED | OUTPATIENT
Start: 2024-04-13 | End: 2024-04-20 | Stop reason: HOSPADM

## 2024-04-13 RX ORDER — POLYETHYLENE GLYCOL 3350 17 G/17G
17 POWDER, FOR SOLUTION ORAL DAILY PRN
Status: DISCONTINUED | OUTPATIENT
Start: 2024-04-13 | End: 2024-04-20 | Stop reason: HOSPADM

## 2024-04-13 RX ORDER — ACETAMINOPHEN 650 MG/1
650 SUPPOSITORY RECTAL EVERY 6 HOURS PRN
Status: DISCONTINUED | OUTPATIENT
Start: 2024-04-13 | End: 2024-04-20 | Stop reason: HOSPADM

## 2024-04-13 RX ORDER — HYDROCODONE BITARTRATE AND ACETAMINOPHEN 5; 325 MG/1; MG/1
1 TABLET ORAL EVERY 4 HOURS PRN
Status: DISCONTINUED | OUTPATIENT
Start: 2024-04-13 | End: 2024-04-20 | Stop reason: HOSPADM

## 2024-04-13 RX ORDER — ACETAMINOPHEN 325 MG/1
650 TABLET ORAL EVERY 6 HOURS PRN
Status: DISCONTINUED | OUTPATIENT
Start: 2024-04-13 | End: 2024-04-20 | Stop reason: HOSPADM

## 2024-04-13 RX ORDER — DIPHENHYDRAMINE HCL 25 MG
25 TABLET ORAL EVERY 6 HOURS PRN
Status: DISCONTINUED | OUTPATIENT
Start: 2024-04-13 | End: 2024-04-20 | Stop reason: HOSPADM

## 2024-04-13 RX ORDER — SODIUM CHLORIDE 9 MG/ML
INJECTION, SOLUTION INTRAVENOUS PRN
Status: DISCONTINUED | OUTPATIENT
Start: 2024-04-13 | End: 2024-04-20 | Stop reason: HOSPADM

## 2024-04-13 RX ORDER — HYDROCODONE BITARTRATE AND ACETAMINOPHEN 5; 325 MG/1; MG/1
1 TABLET ORAL EVERY 6 HOURS PRN
Status: ON HOLD | COMMUNITY
End: 2024-04-20

## 2024-04-13 RX ORDER — HYDROCODONE BITARTRATE AND ACETAMINOPHEN 5; 325 MG/1; MG/1
2 TABLET ORAL EVERY 4 HOURS PRN
Status: DISCONTINUED | OUTPATIENT
Start: 2024-04-13 | End: 2024-04-20 | Stop reason: HOSPADM

## 2024-04-13 RX ORDER — POTASSIUM CHLORIDE 7.45 MG/ML
10 INJECTION INTRAVENOUS PRN
Status: DISCONTINUED | OUTPATIENT
Start: 2024-04-13 | End: 2024-04-20 | Stop reason: HOSPADM

## 2024-04-13 RX ORDER — ONDANSETRON 4 MG/1
4 TABLET, ORALLY DISINTEGRATING ORAL EVERY 8 HOURS PRN
Status: DISCONTINUED | OUTPATIENT
Start: 2024-04-13 | End: 2024-04-20 | Stop reason: HOSPADM

## 2024-04-13 RX ORDER — SODIUM CHLORIDE 0.9 % (FLUSH) 0.9 %
5-40 SYRINGE (ML) INJECTION EVERY 12 HOURS SCHEDULED
Status: DISCONTINUED | OUTPATIENT
Start: 2024-04-13 | End: 2024-04-20 | Stop reason: HOSPADM

## 2024-04-13 RX ORDER — POTASSIUM CHLORIDE 20 MEQ/1
40 TABLET, EXTENDED RELEASE ORAL PRN
Status: DISCONTINUED | OUTPATIENT
Start: 2024-04-13 | End: 2024-04-20 | Stop reason: HOSPADM

## 2024-04-13 RX ORDER — ACETAMINOPHEN 325 MG/1
650 TABLET ORAL EVERY 6 HOURS PRN
COMMUNITY

## 2024-04-13 RX ORDER — ONDANSETRON 2 MG/ML
4 INJECTION INTRAMUSCULAR; INTRAVENOUS EVERY 6 HOURS PRN
Status: DISCONTINUED | OUTPATIENT
Start: 2024-04-13 | End: 2024-04-20 | Stop reason: HOSPADM

## 2024-04-13 RX ORDER — MORPHINE SULFATE 2 MG/ML
1 INJECTION, SOLUTION INTRAMUSCULAR; INTRAVENOUS
Status: DISCONTINUED | OUTPATIENT
Start: 2024-04-13 | End: 2024-04-20 | Stop reason: HOSPADM

## 2024-04-13 RX ORDER — SODIUM CHLORIDE 9 MG/ML
INJECTION, SOLUTION INTRAVENOUS CONTINUOUS
Status: ACTIVE | OUTPATIENT
Start: 2024-04-13 | End: 2024-04-13

## 2024-04-13 RX ORDER — SODIUM CHLORIDE 0.9 % (FLUSH) 0.9 %
5-40 SYRINGE (ML) INJECTION PRN
Status: DISCONTINUED | OUTPATIENT
Start: 2024-04-13 | End: 2024-04-20 | Stop reason: HOSPADM

## 2024-04-13 RX ORDER — IBUPROFEN 200 MG
200 TABLET ORAL EVERY 6 HOURS PRN
COMMUNITY

## 2024-04-13 RX ORDER — MAGNESIUM SULFATE IN WATER 40 MG/ML
2000 INJECTION, SOLUTION INTRAVENOUS PRN
Status: DISCONTINUED | OUTPATIENT
Start: 2024-04-13 | End: 2024-04-20 | Stop reason: HOSPADM

## 2024-04-13 RX ADMIN — VANCOMYCIN HYDROCHLORIDE 2250 MG: 1 INJECTION, POWDER, LYOPHILIZED, FOR SOLUTION INTRAVENOUS at 13:31

## 2024-04-13 RX ADMIN — HEPARIN SODIUM 6900 UNITS: 1000 INJECTION INTRAVENOUS; SUBCUTANEOUS at 00:12

## 2024-04-13 RX ADMIN — HYDROCODONE BITARTRATE AND ACETAMINOPHEN 1 TABLET: 5; 325 TABLET ORAL at 19:52

## 2024-04-13 RX ADMIN — HEPARIN SODIUM 18 UNITS/KG/HR: 10000 INJECTION, SOLUTION INTRAVENOUS at 00:15

## 2024-04-13 RX ADMIN — HEPARIN SODIUM 3450 UNITS: 1000 INJECTION INTRAVENOUS; SUBCUTANEOUS at 12:19

## 2024-04-13 RX ADMIN — MORPHINE SULFATE 2 MG: 2 INJECTION, SOLUTION INTRAMUSCULAR; INTRAVENOUS at 21:52

## 2024-04-13 RX ADMIN — CEPHALEXIN 500 MG: 250 CAPSULE ORAL at 00:16

## 2024-04-13 RX ADMIN — CEFTRIAXONE SODIUM 1000 MG: 1 INJECTION, POWDER, FOR SOLUTION INTRAMUSCULAR; INTRAVENOUS at 08:10

## 2024-04-13 RX ADMIN — HEPARIN SODIUM 20 UNITS/KG/HR: 10000 INJECTION, SOLUTION INTRAVENOUS at 15:33

## 2024-04-13 RX ADMIN — HEPARIN SODIUM 3450 UNITS: 1000 INJECTION INTRAVENOUS; SUBCUTANEOUS at 19:43

## 2024-04-13 RX ADMIN — ACETAMINOPHEN 650 MG: 325 TABLET ORAL at 21:52

## 2024-04-13 RX ADMIN — SODIUM CHLORIDE: 9 INJECTION, SOLUTION INTRAVENOUS at 08:10

## 2024-04-13 RX ADMIN — ACETAMINOPHEN 650 MG: 325 TABLET ORAL at 06:49

## 2024-04-13 RX ADMIN — DIPHENHYDRAMINE HYDROCHLORIDE 25 MG: 25 TABLET ORAL at 08:06

## 2024-04-13 RX ADMIN — MORPHINE SULFATE 2 MG: 2 INJECTION, SOLUTION INTRAMUSCULAR; INTRAVENOUS at 14:15

## 2024-04-13 ASSESSMENT — PAIN DESCRIPTION - ORIENTATION
ORIENTATION: LEFT
ORIENTATION: LEFT

## 2024-04-13 ASSESSMENT — PAIN - FUNCTIONAL ASSESSMENT
PAIN_FUNCTIONAL_ASSESSMENT: 0-10
PAIN_FUNCTIONAL_ASSESSMENT: NONE - DENIES PAIN
PAIN_FUNCTIONAL_ASSESSMENT: 0-10
PAIN_FUNCTIONAL_ASSESSMENT: NONE - DENIES PAIN

## 2024-04-13 ASSESSMENT — PAIN DESCRIPTION - DESCRIPTORS: DESCRIPTORS: ACHING

## 2024-04-13 ASSESSMENT — PAIN SCALES - GENERAL
PAINLEVEL_OUTOF10: 5
PAINLEVEL_OUTOF10: 8
PAINLEVEL_OUTOF10: 4
PAINLEVEL_OUTOF10: 4
PAINLEVEL_OUTOF10: 5
PAINLEVEL_OUTOF10: 5
PAINLEVEL_OUTOF10: 6
PAINLEVEL_OUTOF10: 5

## 2024-04-13 ASSESSMENT — LIFESTYLE VARIABLES
HOW OFTEN DO YOU HAVE A DRINK CONTAINING ALCOHOL: MONTHLY OR LESS
HOW MANY STANDARD DRINKS CONTAINING ALCOHOL DO YOU HAVE ON A TYPICAL DAY: 1 OR 2

## 2024-04-13 ASSESSMENT — PAIN DESCRIPTION - LOCATION
LOCATION: LEG
LOCATION: LEG

## 2024-04-13 ASSESSMENT — PAIN DESCRIPTION - PAIN TYPE: TYPE: ACUTE PAIN

## 2024-04-13 NOTE — ED NOTES
Patient resting on cot. VS stable. Patient covered by blanket. Lights dimmed for comfort. Call light in reach.

## 2024-04-13 NOTE — ED TRIAGE NOTES
Pt to the ED via personal  transport. Pt presents with complaints of hurting his knee at work on April 4th. Pt states his knee continues to hurt. Pt arrived with coband wrapped from his thigh to his toes. Above and below coband are noted to be purple and edematous. This RN removed coband and assessed pedal pulses d/t swelling and discoloration of toes. Pedal pulse is  present. Pt states no one advised that he use this material, he purchased it to help with swelling. Patient is alert and oriented x 4. Respirations are regular and unlabored. Patient provided blanket.  Call light within reach.

## 2024-04-13 NOTE — ED NOTES
[facility]. Floor contacted before transport,spoke to Sha.

## 2024-04-13 NOTE — ED NOTES
Bedside report given to Josue ZAMAN. Patient resting on cot. VS stable. Call light in reach. Patient denies any needs at this time.

## 2024-04-13 NOTE — ED NOTES
ED to inpatient nurses report      Chief Complaint:  Chief Complaint   Patient presents with    Joint Swelling     Left knee     Present to ED from: home    MOA:     LOC: alert and orientated to name, place, date  Mobility: Requires assistance * 1  Oxygen Baseline: none    Current needs required: none     Code Status:   No Order    What abnormal results were found and what did you give/do to treat them? Labs - inflamation - meds/admission  Any procedures or intervention occur? Removed coband he put on his whole left leg     Mental Status:  Level of Consciousness: Alert (0)    Psych Assessment:        Vitals:  Patient Vitals for the past 24 hrs:   BP Temp Pulse Resp SpO2 Weight   04/12/24 2246 126/81 -- 94 -- 94 % --   04/12/24 2147 (!) 141/90 97.8 °F (36.6 °C) 98 17 96 % 86.2 kg (190 lb)        LDAs:   Peripheral IV 04/12/24 Proximal;Right Forearm (Active)       Ambulatory Status:  No data recorded    Diagnosis:  DISPOSITION Decision To Admit 04/12/2024 11:52:30 PM   Final diagnoses:   Acute deep vein thrombosis (DVT) of proximal vein of left lower extremity (HCC)   Cellulitis of left lower extremity        Consults:  None     Pain Score:       C-SSRS:   Risk of Suicide: No Risk    Sepsis Screening:  Sepsis Risk Score: 2.84    [place] Fall Risk:       Swallow Screening        Preferred Language:   English      ALLERGIES     Amoxicillin    SURGICAL HISTORY       Past Surgical History:   Procedure Laterality Date    HERNIA REPAIR Right     age 23 Dr Banda    INGUINAL HERNIA REPAIR Left 05/2021       PAST MEDICAL HISTORY       Past Medical History:   Diagnosis Date    Inguinal hernia 03/2021    left           Electronically signed by Terrie Oro RN on 4/12/2024 at 11:59 PM

## 2024-04-13 NOTE — PROGRESS NOTES
Pt admitted to  7K10 from ED.     Complaints: cellulitis, left leg DVT.      IV normal saline infusing into the forearm right, condition patent and no redness at a rate of 100 mls/ hour with about 600 mls in the bag still. IV site free of s/s of infection or infiltration. Vital signs obtained. Assessment and data collection initiated.     Two nurse skin assessment performed by Sha ZAMAN and Liborio ZAMNA. Oriented to room.     Explained patients right to have family, representative or physician notified of their admission.  Patient has Declined for physician to be notified.  Patient has Declined for family/representative to be notified.    The patient is interested in [facility]’s meds to beds program?:  No    Policies and procedures for  explained. All questions answered with no further questions at this time. Fall prevention and safety brochure discussed with patient.  Bed alarm on. Call light in reach.

## 2024-04-13 NOTE — H&P
Hospitalist History and Physical          Patient Info:   Name: Fran Howell, : 1965, PCP: Phi Cueto MD  Unit/Bed:38/038A      Admitted on: 2024  Date of Service: Pt seen/examined on 24 and Admitted to Observation with expected LOS less than two midnights due to medical therapy.       Assessment and Plan:  Left leg pain, query DVT: Ddimer 6512, unable to obtain Vascular studies at this time  Continue heparin drip at this time, transition to PO when able  Vascular studies pending    Query Cellulitis LLE: WBC 10.3, CRP 34.61. 1/4 SIRS criteria at this time not meeting sepsis criteria. Noted redness and warmth from toes up to left groin and severe tenderness.   Lactic acid pending  Inflammatory markers pending  Blood cultures pending  Start on Ceftriaxone and pharmacy to dose vanc, deescalate as able  MRSA culture pending    Vomiting: Patient states that he has had several episodes of non-bloody emesis  IVF  PRN antiemetics    Dehydration likely 2/2 vomiting:   IVF      Data reviewed  EKG:  I have reviewed the EKG with the following interpretation: pending  Imaging: I have reviewed X-ray of Left knee with the following interpretation: No acute osseous abnormality. Moderate knee joint effusion. Subcutaneous edema.      ===================================================================      Chief Complaint:  left leg pain    History Of Present Illness:  Fran Howell is a 58 y.o. male with no PMHx who presents to [facility] with left leg pain.  Patient states that he injured his leg last Thursday and he wrapped his leg, but today the pain has become unbearable. He is not able to ambulate well at home. He also developed new nausea and vomiting over the last couple of days.     Noted by ED staff that patient's left leg was wrapped with coban as the patient did have a brace for his left leg, but his leg was so swollen he couldn't fit it on the leg. When they assessed the  subcutaneous edema. No subcutaneous emphysema or radiopaque foreign body in the soft tissue.     Impression: 1. No acute osseous abnormality. 2. Moderate knee joint effusion. 3. Subcutaneous edema. This document has been electronically signed by: Christian Olivas MD on 04/12/2024 11:15 PM    XR TIBIA FIBULA LEFT (2 VIEWS)    Result Date: 4/12/2024  Left tibia and fibula x-ray: 2 views. Indication: Swelling, warmth, crunchiness. Comparison: None Findings: No acute fracture or dislocation involving tibia and fibula. Suprapatellar joint effusion. Subcutaneous edema in the thigh and calf. No subcutaneous emphysema or radiopaque foreign body in the soft tissue.     Impression: 1. No acute osseous abnormality. 2. Knee joint effusion. 3. Diffuse subcutaneous edema. This document has been electronically signed by: Christian Olivas MD on 04/12/2024 11:12 PM           Allergies:   Amoxicillin     Home meds  Prior to Admission medications    Not on File       Medical History      Diagnosis Date    Inguinal hernia 03/2021    left       Surgical History      Procedure Laterality Date    HERNIA REPAIR Right     age 23 Dr Banda    INGUINAL HERNIA REPAIR Left 05/2021       Tobacco use:   reports that he has been smoking. He has never used smokeless tobacco.  Alcohol use:   has no history on file for alcohol use.  Drug use:  has no history on file for drug use.     Family Hx      Problem Relation Age of Onset    COPD Mother     Cancer Father         lung    No Known Problems Sister     No Known Problems Brother     No Known Problems Maternal Grandmother     No Known Problems Maternal Grandfather     No Known Problems Paternal Grandmother     No Known Problems Paternal Grandfather     No Known Problems Sister     No Known Problems Sister     No Known Problems Sister          PT/OT Eval Status: pending clinical course  Diet: Diet NPO Exceptions are: Sips of Water with Meds  DVT prophylaxis: heparin drip  Code Status: Full Code  Disposition:

## 2024-04-13 NOTE — ED NOTES
In for hourly rounding. Pt resting on cot in position of comfort with eyes closed and lights dimmed in room for comfort. Pt remains A&Ox4, resps easy and unlabored. IV shows no s/s of infection or infiltration. Pt pain remains unchanged at this time. Monitor remains in place. Updated pt on POC. Will monitor.

## 2024-04-13 NOTE — ED PROVIDER NOTES
[facility] EMERGENCY DEPT      EMERGENCY MEDICINE     Pt Name: Fran Howell  MRN: [id number]  Birthdate 1965  Date of evaluation: 2024  Provider: Emeterio Rasmussen DO  Supervising Physician: Carolyne Chao MD    CHIEF COMPLAINT       Chief Complaint   Patient presents with    Joint Swelling     Left knee     HISTORY OF PRESENT ILLNESS   Fran Howell is a 58 y.o. male, previously healthy, who presents to the emergency department from home for evaluation of left knee pain.  Patient initially injured it on .  Patient states he slipped on something at work and fell backward but caught himself before he fell down.  He merely felt pain along the anterior aspect of his knee.  Patient went to outside hospital on  had an x-ray which showed a joint effusion, he was sent home with Norco.  He followed up with Ortho the next day and they have scheduled him for an MRI.  Today he has been feeling very nauseous and has developed a temperature up to 101.4.  Patient is also been wrapping this knee and lower leg with Coban. Pt denies chest pain, vomiting, new injury/trauma.     PASTMEDICAL HISTORY     Past Medical History:   Diagnosis Date    Inguinal hernia 2021    left       Patient Active Problem List   Diagnosis Code    Scalp lesion L98.9     SURGICAL HISTORY       Past Surgical History:   Procedure Laterality Date    HERNIA REPAIR Right     age 23 Dr Banda    INGUINAL HERNIA REPAIR Left 2021       CURRENT MEDICATIONS       Previous Medications    No medications on file       ALLERGIES     is allergic to amoxicillin.    FAMILY HISTORY     He indicated that his mother is . He indicated that his father is . He indicated that all of his four sisters are alive. He indicated that his brother is alive. He indicated that his maternal grandmother is . He indicated that his maternal grandfather is . He indicated that his paternal grandmother is . He indicated that  arterial occlusion, compartment syndrome            2)  Treatment and Disposition         ED Reassessment:  See ED course         Shared Decision-Making was performed and disposition discussed with the        Patient/Family and questions answered          Social determinants of health impacting treatment or disposition:  N/A         Code Status:  Full      Summary of Patient Presentation:      Medical Decision Making  Nontoxic-appearing 58-year-old male who presents for evaluation of left knee pain, swelling, warmth in the setting of a recent knee injury.  Heart rate in the 90s, rest of vitals are within normal limits.  Patient has 2+ DP and PT pulse on the left side.  Compartments are soft.  No crepitus.  Patient has edema and swelling from his toes all up to his thigh.  Skin appears mottled, knee is warm with some mild overlying cellulitis.  Patient treated with Keflex and heparin.  See ED course.  Septic arthritis is on the differential, pending CRP and ESR at this time.  Do not plan to do an arthrocentesis given his overlying cellulitis.    Amount and/or Complexity of Data Reviewed  Labs: ordered.  Radiology: ordered.    Risk  Decision regarding hospitalization.    /  ED Course as of 04/12/24 2355   Fri Apr 12, 2024   2156 Provider note from 4/6/2024 reviewed [AC]   2315  Impression: 1. No acute osseous abnormality. 2. Moderate knee joint effusion. 3. Subcutaneous edema. [AC]   2332 D-Dimer, Quant(!): 6512.00  Will start heparin, DVT highly likely. U/S techs not present [AC]      ED Course User Index  [AC] Emeterio Rasmussen DO     Vitals Reviewed:    Vitals:    04/12/24 2147 04/12/24 2246   BP: (!) 141/90 126/81   Pulse: 98 94   Resp: 17    Temp: 97.8 °F (36.6 °C)    SpO2: 96% 94%   Weight: 86.2 kg (190 lb)        The patient was seen and examined. Appropriate diagnostic testing was performed and results reviewed with the patient.      The results of pertinent diagnostic studies and exam findings were discussed.  The patient’s provisional diagnosis and plan of care were discussed with the patient and present family who expressed understanding. Any medications were reviewed and indications and risks of medications were discussed with the patient /family present. Strict verbal and written return precautions, instructions and appropriate follow-up provided to  the patient.     ED Medications administered this visit:  (None if blank)  Medications   cephALEXin (KEFLEX) capsule 500 mg (has no administration in time range)   heparin (porcine) injection 6,900 Units (has no administration in time range)   heparin (porcine) injection 6,900 Units (has no administration in time range)   heparin (porcine) injection 3,450 Units (has no administration in time range)   heparin 25,000 units in dextrose 5% 250 mL (premix) infusion (has no administration in time range)   morphine (PF) injection 4 mg (4 mg IntraVENous Given 4/12/24 7606)   ondansetron (ZOFRAN) injection 4 mg (4 mg IntraVENous Given 4/12/24 0963)   sodium chloride 0.9 % bolus 1,000 mL (0 mLs IntraVENous Stopped 4/12/24 3455)         PROCEDURES: (None if blank)  Procedures:     CRITICAL CARE: (None if blank)      DISCHARGE PRESCRIPTIONS: (None if blank)  New Prescriptions    No medications on file       FINAL IMPRESSION      1. Acute deep vein thrombosis (DVT) of proximal vein of left lower extremity (HCC)    2. Cellulitis of left lower extremity          DISPOSITION/PLAN   DISPOSITION Decision To Admit 04/12/2024 11:52:30 PM      OUTPATIENT FOLLOW UP THE PATIENT:  No follow-up provider specified.    Emeterio Rasmussen DO    This transcription was electronically signed. Parts of this transcriptions may have been dictated by use of voice recognition software and electronically transcribed, and parts may have been transcribed with the assistance of an ED scribe. The transcription may contain errors not detected in proofreading.  Please refer to my supervising physician's

## 2024-04-13 NOTE — PROGRESS NOTES
[facility]   Pharmacy Pharmacokinetic Monitoring Service - Vancomycin     Fran Howell is a 58 y.o. male starting on vancomycin therapy for cellulitis. Pharmacy consulted by Chivo Nava CNP for monitoring and adjustment.    Target Concentration: Goal AUC/FRANCES 400-600 mg*hr/L    Additional Antimicrobials: ceftriaxone    Pertinent Laboratory Values:   Wt Readings from Last 1 Encounters:   04/12/24 86.2 kg (190 lb)     Temp Readings from Last 1 Encounters:   04/12/24 97.8 °F (36.6 °C)     CrCl cannot be calculated (Unknown ideal weight.).  Recent Labs     04/12/24  2240 04/12/24  2324 04/13/24  0746   CREATININE  --  0.9 0.9   BUN  --  28* 26*   WBC 10.3  --   --      Pertinent Cultures:  Date Source Results   4/12/24 Blood cx sent   MRSA Nasal Swab: N/A. Non-respiratory infection.    Plan:  Dosing recommendations based on Bayesian software  Start vancomycin 2250 mg IV once, followed by 1250 mg IV q12h  Anticipated AUC of 557 and trough concentration of 15.6 at steady state  Renal labs as indicated   Vancomycin concentration not yet ordered  Pharmacy will continue to monitor patient and adjust therapy as indicated    Thank you for the consult,    Joceline Prasad, JosueD, BCPS   4/13/2024  9:33 AM

## 2024-04-13 NOTE — ED NOTES
ED to inpatient nurses report      Chief Complaint:  Chief Complaint   Patient presents with    Joint Swelling     Left knee     Present to ED from: Home    MOA:     LOC: alert and orientated to name, place, date  Mobility: Requires assistance * 1  Oxygen Baseline: Room Air    Current needs required: Room Air     Code Status:   Full Code    What abnormal results were found and what did you give/do to treat them? See MAR  Any procedures or intervention occur? See Chart    Mental Status:  Level of Consciousness: Alert (0)    Psych Assessment:        Vitals:  Patient Vitals for the past 24 hrs:   BP Temp Pulse Resp SpO2 Weight   04/13/24 0943 119/61 -- 83 18 96 % --   04/13/24 0759 116/61 -- -- 16 93 % --   04/13/24 0705 128/81 -- (!) 103 18 92 % --   04/13/24 0613 130/74 -- (!) 114 18 92 % --   04/13/24 0516 (!) 142/84 -- (!) 109 17 91 % --   04/13/24 0356 (!) 127/97 -- (!) 104 16 93 % --   04/13/24 0145 114/74 -- -- -- -- --   04/13/24 0044 -- -- 98 -- -- --   04/13/24 0031 126/74 -- -- -- -- --   04/12/24 2346 117/81 -- (!) 101 16 100 % --   04/12/24 2246 126/81 -- 94 -- 94 % --   04/12/24 2147 (!) 141/90 97.8 °F (36.6 °C) 98 17 96 % 86.2 kg (190 lb)        LDAs:   Peripheral IV 04/12/24 Proximal;Right Forearm (Active)   Site Assessment Clean, dry & intact 04/13/24 0709   Line Status Infusing 04/13/24 0709   Phlebitis Assessment No symptoms 04/13/24 0709   Infiltration Assessment 0 04/13/24 0709   Dressing Status Clean, dry & intact 04/13/24 0709       Ambulatory Status:  No data recorded    Diagnosis:  DISPOSITION Admitted 04/13/2024 12:11:59 AM   Final diagnoses:   Acute deep vein thrombosis (DVT) of proximal vein of left lower extremity (HCC)   Cellulitis of left lower extremity        Consults:  IP CONSULT TO PHARMACY     Pain Score:  Pain Assessment  Pain Assessment: 0-10  Pain Level: 5  Pain Type: Acute pain    C-SSRS:   Risk of Suicide: No Risk    Sepsis Screening:  Sepsis Risk Score: 2.16    Dixon Lockett

## 2024-04-14 LAB
ANION GAP SERPL CALC-SCNC: 9 MEQ/L (ref 8–16)
BASOPHILS ABSOLUTE: 0 THOU/MM3 (ref 0–0.1)
BASOPHILS NFR BLD AUTO: 0.4 %
BUN SERPL-MCNC: 17 MG/DL (ref 7–22)
CALCIUM SERPL-MCNC: 7.8 MG/DL (ref 8.5–10.5)
CHLORIDE SERPL-SCNC: 99 MEQ/L (ref 98–111)
CO2 SERPL-SCNC: 24 MEQ/L (ref 23–33)
CREAT SERPL-MCNC: 0.7 MG/DL (ref 0.4–1.2)
DEPRECATED RDW RBC AUTO: 51.2 FL (ref 35–45)
EOSINOPHIL NFR BLD AUTO: 1.6 %
EOSINOPHILS ABSOLUTE: 0.2 THOU/MM3 (ref 0–0.4)
ERYTHROCYTE [DISTWIDTH] IN BLOOD BY AUTOMATED COUNT: 13.9 % (ref 11.5–14.5)
GFR SERPL CREATININE-BSD FRML MDRD: > 90 ML/MIN/1.73M2
GLUCOSE SERPL-MCNC: 145 MG/DL (ref 70–108)
HCT VFR BLD AUTO: 38.2 % (ref 42–52)
HEPARIN UNFRACTIONATED: 0.2 U/ML (ref 0.3–0.7)
HEPARIN UNFRACTIONATED: 0.21 U/ML (ref 0.3–0.7)
HEPARIN UNFRACTIONATED: 0.23 U/ML (ref 0.3–0.7)
HEPARIN UNFRACTIONATED: 0.35 U/ML (ref 0.3–0.7)
HGB BLD-MCNC: 12.6 GM/DL (ref 14–18)
IMM GRANULOCYTES # BLD AUTO: 0.22 THOU/MM3 (ref 0–0.07)
IMM GRANULOCYTES NFR BLD AUTO: 2.2 %
LYMPHOCYTES ABSOLUTE: 1.6 THOU/MM3 (ref 1–4.8)
LYMPHOCYTES NFR BLD AUTO: 16.4 %
MCH RBC QN AUTO: 32.9 PG (ref 26–33)
MCHC RBC AUTO-ENTMCNC: 33 GM/DL (ref 32.2–35.5)
MCV RBC AUTO: 99.7 FL (ref 80–94)
MONOCYTES ABSOLUTE: 1.3 THOU/MM3 (ref 0.4–1.3)
MONOCYTES NFR BLD AUTO: 13.3 %
MRSA DNA SPEC QL NAA+PROBE: NEGATIVE
NEUTROPHILS NFR BLD AUTO: 66.1 %
NRBC BLD AUTO-RTO: 0 /100 WBC
PLATELET # BLD AUTO: 337 THOU/MM3 (ref 130–400)
PMV BLD AUTO: 9.2 FL (ref 9.4–12.4)
POTASSIUM SERPL-SCNC: 4 MEQ/L (ref 3.5–5.2)
RBC # BLD AUTO: 3.83 MILL/MM3 (ref 4.7–6.1)
SEGMENTED NEUTROPHILS ABSOLUTE COUNT: 6.6 THOU/MM3 (ref 1.8–7.7)
SODIUM SERPL-SCNC: 132 MEQ/L (ref 135–145)
WBC # BLD AUTO: 10 THOU/MM3 (ref 4.8–10.8)

## 2024-04-14 PROCEDURE — 85025 COMPLETE CBC W/AUTO DIFF WBC: CPT

## 2024-04-14 PROCEDURE — 93010 ELECTROCARDIOGRAM REPORT: CPT | Performed by: NUCLEAR MEDICINE

## 2024-04-14 PROCEDURE — 85520 HEPARIN ASSAY: CPT

## 2024-04-14 PROCEDURE — 6360000002 HC RX W HCPCS

## 2024-04-14 PROCEDURE — 6360000002 HC RX W HCPCS: Performed by: EMERGENCY MEDICINE

## 2024-04-14 PROCEDURE — 80048 BASIC METABOLIC PNL TOTAL CA: CPT

## 2024-04-14 PROCEDURE — 99233 SBSQ HOSP IP/OBS HIGH 50: CPT | Performed by: PHYSICIAN ASSISTANT

## 2024-04-14 PROCEDURE — 96376 TX/PRO/DX INJ SAME DRUG ADON: CPT

## 2024-04-14 PROCEDURE — G0378 HOSPITAL OBSERVATION PER HR: HCPCS

## 2024-04-14 PROCEDURE — 2580000003 HC RX 258

## 2024-04-14 PROCEDURE — 6370000000 HC RX 637 (ALT 250 FOR IP): Performed by: PHYSICIAN ASSISTANT

## 2024-04-14 PROCEDURE — 96366 THER/PROPH/DIAG IV INF ADDON: CPT

## 2024-04-14 PROCEDURE — 36415 COLL VENOUS BLD VENIPUNCTURE: CPT

## 2024-04-14 PROCEDURE — 96368 THER/DIAG CONCURRENT INF: CPT

## 2024-04-14 RX ORDER — CALCIUM CARBONATE 500 MG/1
500 TABLET, CHEWABLE ORAL 3 TIMES DAILY PRN
Status: DISCONTINUED | OUTPATIENT
Start: 2024-04-14 | End: 2024-04-20 | Stop reason: HOSPADM

## 2024-04-14 RX ADMIN — SODIUM CHLORIDE, PRESERVATIVE FREE 10 ML: 5 INJECTION INTRAVENOUS at 07:54

## 2024-04-14 RX ADMIN — HEPARIN SODIUM 24 UNITS/KG/HR: 10000 INJECTION, SOLUTION INTRAVENOUS at 05:02

## 2024-04-14 RX ADMIN — HYDROCODONE BITARTRATE AND ACETAMINOPHEN 2 TABLET: 5; 325 TABLET ORAL at 18:52

## 2024-04-14 RX ADMIN — HYDROCODONE BITARTRATE AND ACETAMINOPHEN 2 TABLET: 5; 325 TABLET ORAL at 10:32

## 2024-04-14 RX ADMIN — HEPARIN SODIUM 3450 UNITS: 1000 INJECTION INTRAVENOUS; SUBCUTANEOUS at 01:37

## 2024-04-14 RX ADMIN — CEFTRIAXONE SODIUM 1000 MG: 1 INJECTION, POWDER, FOR SOLUTION INTRAMUSCULAR; INTRAVENOUS at 05:03

## 2024-04-14 RX ADMIN — HEPARIN SODIUM 3450 UNITS: 1000 INJECTION INTRAVENOUS; SUBCUTANEOUS at 07:32

## 2024-04-14 RX ADMIN — HEPARIN SODIUM 3450 UNITS: 1000 INJECTION INTRAVENOUS; SUBCUTANEOUS at 19:42

## 2024-04-14 RX ADMIN — HEPARIN SODIUM 26 UNITS/KG/HR: 10000 INJECTION, SOLUTION INTRAVENOUS at 17:22

## 2024-04-14 RX ADMIN — HYDROCODONE BITARTRATE AND ACETAMINOPHEN 2 TABLET: 5; 325 TABLET ORAL at 14:32

## 2024-04-14 RX ADMIN — VANCOMYCIN HYDROCHLORIDE 1250 MG: 5 INJECTION, POWDER, LYOPHILIZED, FOR SOLUTION INTRAVENOUS at 02:49

## 2024-04-14 RX ADMIN — HYDROCODONE BITARTRATE AND ACETAMINOPHEN 1 TABLET: 5; 325 TABLET ORAL at 05:11

## 2024-04-14 RX ADMIN — MORPHINE SULFATE 2 MG: 2 INJECTION, SOLUTION INTRAMUSCULAR; INTRAVENOUS at 02:46

## 2024-04-14 ASSESSMENT — PAIN DESCRIPTION - DESCRIPTORS
DESCRIPTORS: ACHING

## 2024-04-14 ASSESSMENT — PAIN DESCRIPTION - FREQUENCY
FREQUENCY: CONTINUOUS
FREQUENCY: CONTINUOUS

## 2024-04-14 ASSESSMENT — PAIN DESCRIPTION - LOCATION
LOCATION: LEG

## 2024-04-14 ASSESSMENT — PAIN - FUNCTIONAL ASSESSMENT
PAIN_FUNCTIONAL_ASSESSMENT: ACTIVITIES ARE NOT PREVENTED
PAIN_FUNCTIONAL_ASSESSMENT: PREVENTS OR INTERFERES SOME ACTIVE ACTIVITIES AND ADLS
PAIN_FUNCTIONAL_ASSESSMENT: PREVENTS OR INTERFERES WITH MANY ACTIVE NOT PASSIVE ACTIVITIES
PAIN_FUNCTIONAL_ASSESSMENT: PREVENTS OR INTERFERES WITH MANY ACTIVE NOT PASSIVE ACTIVITIES

## 2024-04-14 ASSESSMENT — PAIN DESCRIPTION - ORIENTATION
ORIENTATION: LEFT

## 2024-04-14 ASSESSMENT — PAIN SCALES - GENERAL
PAINLEVEL_OUTOF10: 3
PAINLEVEL_OUTOF10: 3
PAINLEVEL_OUTOF10: 7
PAINLEVEL_OUTOF10: 8
PAINLEVEL_OUTOF10: 5
PAINLEVEL_OUTOF10: 7
PAINLEVEL_OUTOF10: 8

## 2024-04-14 ASSESSMENT — PAIN DESCRIPTION - ONSET
ONSET: ON-GOING
ONSET: ON-GOING

## 2024-04-14 ASSESSMENT — PAIN DESCRIPTION - PAIN TYPE
TYPE: ACUTE PAIN
TYPE: ACUTE PAIN

## 2024-04-14 NOTE — PLAN OF CARE
Problem: Genitourinary - Adult  Goal: Absence of urinary retention  Outcome: Progressing     Problem: Musculoskeletal - Adult  Goal: Return mobility to safest level of function  Outcome: Progressing     Problem: Pain  Goal: Verbalizes/displays adequate comfort level or baseline comfort level  Outcome: Progressing

## 2024-04-14 NOTE — PLAN OF CARE
Problem: Discharge Planning  Goal: Discharge to home or other facility with appropriate resources  Outcome: Progressing  Flowsheets (Taken 4/14/2024 1651)  Discharge to home or other facility with appropriate resources: Identify barriers to discharge with patient and caregiver     Problem: Pain  Goal: Verbalizes/displays adequate comfort level or baseline comfort level  Outcome: Progressing  Flowsheets (Taken 4/14/2024 1651)  Verbalizes/displays adequate comfort level or baseline comfort level: Encourage patient to monitor pain and request assistance     Problem: Skin/Tissue Integrity  Goal: Absence of new skin breakdown  Description: 1.  Monitor for areas of redness and/or skin breakdown  2.  Assess vascular access sites hourly  3.  Every 4-6 hours minimum:  Change oxygen saturation probe site  4.  Every 4-6 hours:  If on nasal continuous positive airway pressure, respiratory therapy assess nares and determine need for appliance change or resting period.  Outcome: Progressing  Note: No new skin breakdown noted. Patient turns self.      Problem: Safety - Adult  Goal: Free from fall injury  Outcome: Progressing  Flowsheets (Taken 4/14/2024 1651)  Free From Fall Injury: Instruct family/caregiver on patient safety     Problem: Musculoskeletal - Adult  Goal: Return mobility to safest level of function  Outcome: Progressing  Flowsheets (Taken 4/14/2024 1651)  Return Mobility to Safest Level of Function: Assess patient stability and activity tolerance for standing, transferring and ambulating with or without assistive devices     Problem: Gastrointestinal - Adult  Goal: Minimal or absence of nausea and vomiting  Outcome: Progressing  Flowsheets (Taken 4/14/2024 1651)  Minimal or absence of nausea and vomiting: Advance diet as tolerated, if ordered     Problem: Genitourinary - Adult  Goal: Absence of urinary retention  Outcome: Progressing  Flowsheets (Taken 4/14/2024 1651)  Absence of urinary retention: Assess patient’s

## 2024-04-14 NOTE — CONSULTS
Orthopedic Consult      CHIEF COMPLAINT:  L knee    HISTORY OF PRESENT ILLNESS:      The patient is a 58 y.o. male who presented to the [facility] emergency department on 4/12/2024 secondary to increasing left knee pain.  Patient describes an injury at work, this was on 4/4/2024.  He describes this as a twisting and hyperextension type mechanism.  He did go to an outlying hospital following this injury where x-rays at that time showed an acute effusion of the knee.  He was evaluated in our office by Dr. Jackson and has been set up for an MRI for better evaluation as an outpatient.  Again presented on 4/12/2024 secondary to increasing pain which was really involving the entire left lower extremity.  He was provided a hinged knee brace but secondary to increased swelling this brace no longer fit and he took it upon himself to wrap the lower extremity and Coban from the toes to the thigh.  Upon presentation to the emergency department there was some redness to this left lower extremity and his D-dimer was significantly elevated.  X-rays continue to show effusion.  Seen in consultation today.  Patient notes some improvement in his pain to this left lower extremity.  He has been started on empiric antibiotics and was also started on heparin gtt. secondary to left lower extremity DVT.    Past Medical History:    Past Medical History:   Diagnosis Date    Inguinal hernia 03/2021    left       Past Surgical History:    Past Surgical History:   Procedure Laterality Date    HERNIA REPAIR Right     age 23 Dr Banda    INGUINAL HERNIA REPAIR Left 05/2021       Medications Prior to Admission:   Prior to Admission medications    Medication Sig Start Date End Date Taking? Authorizing Provider   acetaminophen (TYLENOL) 325 MG tablet Take 2 tablets by mouth every 6 hours as needed for Pain   Yes Provider, MD Henry   HYDROcodone-acetaminophen (NORCO) 5-325 MG per tablet Take 1 tablet by mouth every 6 hours as  needed for Pain. Max Daily Amount: 4 tablets   Yes Provider, MD Henry   ibuprofen (ADVIL;MOTRIN) 200 MG tablet Take 1 tablet by mouth every 6 hours as needed for Pain   Yes Provider, MD Henry       Allergies:    Amoxicillin    Social History:   Social History     Socioeconomic History    Marital status:      Spouse name: Marj Howell    Number of children: 2    Years of education: 14    Highest education level: None   Occupational History    Occupation: [employer]      Employer: [email or web address]   Tobacco Use    Smoking status: Every Day     Current packs/day: 0.75     Types: Cigarettes    Smokeless tobacco: Never   Substance and Sexual Activity    Alcohol use: Yes     Comment: every 3 months if that    Drug use: Not Currently    Sexual activity: Yes     Partners: Female     Social Determinants of Health     Financial Resource Strain: Low Risk  (6/7/2022)    Overall Financial Resource Strain (CARDIA)     Difficulty of Paying Living Expenses: Not hard at all   Food Insecurity: Unknown (4/13/2024)    Hunger Vital Sign     Ran Out of Food in the Last Year: Never true   Transportation Needs: No Transportation Needs (4/13/2024)    PRAPARE - Transportation     Lack of Transportation (Medical): No     Lack of Transportation (Non-Medical): No   Housing Stability: Unknown (4/13/2024)    Housing Stability Vital Sign     Unable to Pay for Housing in the Last Year: No     Unstable Housing in the Last Year: No       Family History:  Family History   Problem Relation Age of Onset    COPD Mother     Cancer Father         lung    No Known Problems Sister     No Known Problems Brother     No Known Problems Maternal Grandmother     No Known Problems Maternal Grandfather     No Known Problems Paternal Grandmother     No Known Problems Paternal Grandfather     No Known Problems Sister     No Known Problems Sister     No Known Problems Sister          REVIEW OF SYSTEMS:  General: No fever or

## 2024-04-14 NOTE — PROGRESS NOTES
Patient concern with heartburn during dinner that has since resolved. Patient took a tums from wifes alejandroe and it resolved. Patient denies any chest,arm, or jaw pain. Updated Kaiden Miller on heartburn. See order for tums. Patient aware now to ask for tums if it re-occurs.

## 2024-04-14 NOTE — PROGRESS NOTES
Hospitalist Progress Note    Patient:  Fran Howell      Unit/Bed:7K-10/010-A    YOB: 1965    MRN: [id number]       Acct: [id number]     PCP: Phi Cueto MD    Date of Admission: 4/12/2024    Assessment/Plan:    Left Leg Pain and Swelling with Left Knee Effusion: Ddimer 6559  Doppler US verbally positive for DVT though formal report pending  Continue heparin drip at this time, transition to PO when able  Patient with reported knee injury prior to arrival and was wrapping his leg with coban with discoloration of toes noted on arrival. Wonder if patient possibly gave himself a blood clot due to this?  Orthopedic surgery consulted for evaluation of L knee pain    Possible Cellulitis vs. Septic Arthritis of LLE: Patient with intermittent fevers- possibly 2/2 clot however redness reported upon arrival  CRP elevated  Blood cultures so far negative  Started on Ceftriaxone and Vancomycin upon admission  MRSA culture negative, Vancomycin dc'd  Orthopedic surgery consulted, low suspicion for septic arthritis but appreciate ortho recommendations  WBC WNL, trend CBC     Vomiting: Patient states that he has had several episodes of non-bloody emesis  IVF  PRN antiemetics     Dehydration likely 2/2 vomiting:   IVF    Chief Complaint: Left Leg Pain      Subjective: 58 y.o. male admitted to the hospitalist service for left leg pain. Patient rates his pain a 2-3/10. He denies nausea or vomiting. He denies chest pain. He denies SOB.      Medications:    Infusion Medications    sodium chloride      heparin (PORCINE) Infusion 26 Units/kg/hr (04/14/24 0730)     Scheduled Medications    sodium chloride flush  5-40 mL IntraVENous 2 times per day    cefTRIAXone (ROCEPHIN) IV  1,000 mg IntraVENous Q24H     PRN Meds: sodium chloride flush, sodium chloride, potassium chloride **OR** potassium alternative oral replacement **OR** potassium chloride, magnesium sulfate, ondansetron **OR** ondansetron, polyethylene

## 2024-04-15 PROBLEM — I82.462 ACUTE DEEP VEIN THROMBOSIS (DVT) OF CALF MUSCLE VEIN OF LEFT LOWER EXTREMITY (HCC): Status: ACTIVE | Noted: 2024-04-15

## 2024-04-15 LAB
ANION GAP SERPL CALC-SCNC: 8 MEQ/L (ref 8–16)
BASOPHILS ABSOLUTE: 0 THOU/MM3 (ref 0–0.1)
BASOPHILS NFR BLD AUTO: 0 %
BUN SERPL-MCNC: 14 MG/DL (ref 7–22)
CALCIUM SERPL-MCNC: 8.4 MG/DL (ref 8.5–10.5)
CHLORIDE SERPL-SCNC: 96 MEQ/L (ref 98–111)
CO2 SERPL-SCNC: 30 MEQ/L (ref 23–33)
CREAT SERPL-MCNC: 0.7 MG/DL (ref 0.4–1.2)
DEPRECATED RDW RBC AUTO: 50.4 FL (ref 35–45)
EOSINOPHIL NFR BLD AUTO: 5 %
EOSINOPHILS ABSOLUTE: 0.6 THOU/MM3 (ref 0–0.4)
ERYTHROCYTE [DISTWIDTH] IN BLOOD BY AUTOMATED COUNT: 13.7 % (ref 11.5–14.5)
GFR SERPL CREATININE-BSD FRML MDRD: > 90 ML/MIN/1.73M2
GLUCOSE SERPL-MCNC: 108 MG/DL (ref 70–108)
HCT VFR BLD AUTO: 40.4 % (ref 42–52)
HEPARIN UNFRACTIONATED: 0.27 U/ML (ref 0.3–0.7)
HEPARIN UNFRACTIONATED: 0.3 U/ML (ref 0.3–0.7)
HEPARIN UNFRACTIONATED: 0.32 U/ML (ref 0.3–0.7)
HEPARIN UNFRACTIONATED: 0.37 U/ML (ref 0.3–0.7)
HGB BLD-MCNC: 13.4 GM/DL (ref 14–18)
LYMPHOCYTES ABSOLUTE: 2.6 THOU/MM3 (ref 1–4.8)
LYMPHOCYTES NFR BLD AUTO: 23 %
MANUAL DIFF BLD: NORMAL
MCH RBC QN AUTO: 32.8 PG (ref 26–33)
MCHC RBC AUTO-ENTMCNC: 33.2 GM/DL (ref 32.2–35.5)
MCV RBC AUTO: 98.8 FL (ref 80–94)
METAMYELOCYTES: 1 %
MONOCYTES ABSOLUTE: 0.9 THOU/MM3 (ref 0.4–1.3)
MONOCYTES NFR BLD AUTO: 8 %
NEUTROPHILS NFR BLD AUTO: 63 %
NRBC BLD AUTO-RTO: 0 /100 WBC
PLATELET # BLD AUTO: 399 THOU/MM3 (ref 130–400)
PLATELET BLD QL SMEAR: ADEQUATE
PMV BLD AUTO: 9.3 FL (ref 9.4–12.4)
POTASSIUM SERPL-SCNC: 4.6 MEQ/L (ref 3.5–5.2)
RBC # BLD AUTO: 4.09 MILL/MM3 (ref 4.7–6.1)
SEGMENTED NEUTROPHILS ABSOLUTE COUNT: 7.2 THOU/MM3 (ref 1.8–7.7)
SODIUM SERPL-SCNC: 134 MEQ/L (ref 135–145)
WBC # BLD AUTO: 11.5 THOU/MM3 (ref 4.8–10.8)

## 2024-04-15 PROCEDURE — 6360000002 HC RX W HCPCS: Performed by: EMERGENCY MEDICINE

## 2024-04-15 PROCEDURE — 6360000002 HC RX W HCPCS

## 2024-04-15 PROCEDURE — 85520 HEPARIN ASSAY: CPT

## 2024-04-15 PROCEDURE — 96366 THER/PROPH/DIAG IV INF ADDON: CPT

## 2024-04-15 PROCEDURE — G0378 HOSPITAL OBSERVATION PER HR: HCPCS

## 2024-04-15 PROCEDURE — 80048 BASIC METABOLIC PNL TOTAL CA: CPT

## 2024-04-15 PROCEDURE — 36415 COLL VENOUS BLD VENIPUNCTURE: CPT

## 2024-04-15 PROCEDURE — 96368 THER/DIAG CONCURRENT INF: CPT

## 2024-04-15 PROCEDURE — 2580000003 HC RX 258

## 2024-04-15 PROCEDURE — 6370000000 HC RX 637 (ALT 250 FOR IP)

## 2024-04-15 PROCEDURE — 6370000000 HC RX 637 (ALT 250 FOR IP): Performed by: PHYSICIAN ASSISTANT

## 2024-04-15 PROCEDURE — 1200000000 HC SEMI PRIVATE

## 2024-04-15 PROCEDURE — 99232 SBSQ HOSP IP/OBS MODERATE 35: CPT | Performed by: PHYSICIAN ASSISTANT

## 2024-04-15 PROCEDURE — 85025 COMPLETE CBC W/AUTO DIFF WBC: CPT

## 2024-04-15 PROCEDURE — 96376 TX/PRO/DX INJ SAME DRUG ADON: CPT

## 2024-04-15 RX ADMIN — HEPARIN SODIUM 30 UNITS/KG/HR: 10000 INJECTION, SOLUTION INTRAVENOUS at 15:20

## 2024-04-15 RX ADMIN — HYDROCODONE BITARTRATE AND ACETAMINOPHEN 2 TABLET: 5; 325 TABLET ORAL at 06:51

## 2024-04-15 RX ADMIN — CEFTRIAXONE SODIUM 1000 MG: 1 INJECTION, POWDER, FOR SOLUTION INTRAMUSCULAR; INTRAVENOUS at 05:11

## 2024-04-15 RX ADMIN — HYDROCODONE BITARTRATE AND ACETAMINOPHEN 2 TABLET: 5; 325 TABLET ORAL at 18:26

## 2024-04-15 RX ADMIN — HEPARIN SODIUM 28 UNITS/KG/HR: 10000 INJECTION, SOLUTION INTRAVENOUS at 05:16

## 2024-04-15 RX ADMIN — HYDROCODONE BITARTRATE AND ACETAMINOPHEN 2 TABLET: 5; 325 TABLET ORAL at 02:03

## 2024-04-15 RX ADMIN — ACETAMINOPHEN 650 MG: 325 TABLET ORAL at 00:07

## 2024-04-15 RX ADMIN — HEPARIN SODIUM 3450 UNITS: 1000 INJECTION INTRAVENOUS; SUBCUTANEOUS at 07:44

## 2024-04-15 RX ADMIN — HYDROCODONE BITARTRATE AND ACETAMINOPHEN 2 TABLET: 5; 325 TABLET ORAL at 11:19

## 2024-04-15 RX ADMIN — MORPHINE SULFATE 2 MG: 2 INJECTION, SOLUTION INTRAMUSCULAR; INTRAVENOUS at 00:07

## 2024-04-15 ASSESSMENT — PAIN DESCRIPTION - LOCATION
LOCATION: LEG

## 2024-04-15 ASSESSMENT — PAIN - FUNCTIONAL ASSESSMENT
PAIN_FUNCTIONAL_ASSESSMENT: PREVENTS OR INTERFERES SOME ACTIVE ACTIVITIES AND ADLS
PAIN_FUNCTIONAL_ASSESSMENT: ACTIVITIES ARE NOT PREVENTED

## 2024-04-15 ASSESSMENT — PAIN SCALES - GENERAL
PAINLEVEL_OUTOF10: 5
PAINLEVEL_OUTOF10: 7
PAINLEVEL_OUTOF10: 3
PAINLEVEL_OUTOF10: 7
PAINLEVEL_OUTOF10: 2
PAINLEVEL_OUTOF10: 10
PAINLEVEL_OUTOF10: 7

## 2024-04-15 ASSESSMENT — PAIN DESCRIPTION - PAIN TYPE
TYPE: ACUTE PAIN

## 2024-04-15 ASSESSMENT — PAIN DESCRIPTION - ORIENTATION
ORIENTATION: LEFT

## 2024-04-15 ASSESSMENT — PAIN DESCRIPTION - FREQUENCY: FREQUENCY: CONTINUOUS

## 2024-04-15 ASSESSMENT — PAIN DESCRIPTION - DESCRIPTORS
DESCRIPTORS: ACHING

## 2024-04-15 ASSESSMENT — PAIN DESCRIPTION - ONSET: ONSET: ON-GOING

## 2024-04-15 NOTE — PROGRESS NOTES
Hospitalist Progress Note    Patient:  Fran Howell      Unit/Bed:7K-10/010-A    YOB: 1965    MRN: [id number]       Acct: [id number]     PCP: Phi Cueto MD    Date of Admission: 4/12/2024    Assessment/Plan:    Left Leg Pain and Swelling with Left Knee Effusion: Ddimer 6512  Doppler US with acute DVT of single peroneal vein in the left calf  Continue heparin drip at this time- Ortho with no plans for arthrocentesis for now  Patient with reported knee injury prior to arrival and was wrapping his leg with coban with discoloration of toes noted on arrival. Wonder if patient possibly gave himself a blood clot due to this?  Orthopedic surgery consulted and following    Possible Cellulitis vs. Septic Arthritis of LLE: Patient with intermittent fevers- possibly 2/2 clot however redness reported upon arrival  CRP elevated  Blood cultures so far negative  Started on Ceftriaxone and Vancomycin upon admission  MRSA culture negative, Vancomycin dc'd  Orthopedic surgery consulted, low suspicion for septic arthritis but appreciate ortho recommendations  WBC increased to 11.5 4/15/24, previously normal     Vomiting: Patient states that he has had several episodes of non-bloody emesis  IVF  PRN antiemetics     Dehydration likely 2/2 vomiting:   IVF    Chief Complaint: Left Leg Pain      Subjective: 58 y.o. male admitted to the hospitalist service for left leg pain. Patient spiked a fever overnight of 100.8. Ortho with no plans for arthrocentesis at this time. Patient rates his pain a 7/10 in severity. He denies chest pain. He denies nausea or vomiting.      Medications:    Infusion Medications    sodium chloride      heparin (PORCINE) Infusion 30 Units/kg/hr (04/15/24 0746)     Scheduled Medications    sodium chloride flush  5-40 mL IntraVENous 2 times per day    cefTRIAXone (ROCEPHIN) IV  1,000 mg IntraVENous Q24H     PRN Meds: calcium carbonate, sodium chloride flush, sodium chloride, potassium

## 2024-04-15 NOTE — PLAN OF CARE
Problem: Pain  Goal: Verbalizes/displays adequate comfort level or baseline comfort level  4/14/2024 2206 by Rosemarie Emanuel RN  Outcome: Progressing  4/14/2024 1651 by Frederick Roe, RN  Outcome: Progressing  Flowsheets (Taken 4/14/2024 1651)  Verbalizes/displays adequate comfort level or baseline comfort level: Encourage patient to monitor pain and request assistance     Problem: Skin/Tissue Integrity  Goal: Absence of new skin breakdown  Description: 1.  Monitor for areas of redness and/or skin breakdown  2.  Assess vascular access sites hourly  3.  Every 4-6 hours minimum:  Change oxygen saturation probe site  4.  Every 4-6 hours:  If on nasal continuous positive airway pressure, respiratory therapy assess nares and determine need for appliance change or resting period.  4/14/2024 2206 by Rosemarie Emanuel RN  Outcome: Progressing  4/14/2024 1651 by Frederick Roe, RN  Outcome: Progressing  Note: No new skin breakdown noted. Patient turns self.      Problem: Musculoskeletal - Adult  Goal: Return mobility to safest level of function  4/14/2024 2206 by Rosemarie Emanuel, RN  Outcome: Progressing  4/14/2024 1651 by Frederick Roe, RN  Outcome: Progressing  Flowsheets (Taken 4/14/2024 1651)  Return Mobility to Safest Level of Function: Assess patient stability and activity tolerance for standing, transferring and ambulating with or without assistive devices

## 2024-04-15 NOTE — CARE COORDINATION
Case Management Assessment Initial Evaluation    Date/Time of Evaluation: 4/15/2024 7:47 AM  Assessment Completed by: Roberta Maria RN    If patient is discharged prior to next notation, then this note serves as note for discharge by case management.    Patient Name: Fran Howell                   YOB: 1965  Diagnosis: Left leg pain [M79.605]  Cellulitis of left lower extremity [L03.116]  Acute deep vein thrombosis (DVT) of proximal vein of left lower extremity (HCC) [I82.4Y2]                   Date / Time: 4/12/2024  9:37 PM  [facility]: [facility]     Patient Admission Status: Observation   Readmission Risk Low 0-14, Mod 15-19), High > 20: No data recorded  Current PCP: Phi Cueto MD    Additional Case Management Notes: to ED from home for evaluation of left knee pain.  Patient initially injured it on April 4 while at work.  + DVT LLE, on Heparin gtt.    Procedures: na    Imaging:   Left LE doppler  Acute deep venous thrombosis limited to a single peroneal vein in the left calf.  Moderate size suprapatellar seroma or joint effusion.  Patient Goals/Plan/Treatment Preferences: Met with Fran; he lives home with his wife, along with other family members. He fell while at work 4/4/24 and this is workman comp case. His sister is helping pt get a hospital bed thru local [employer]. He has some DME, and he shared the rep to call at his work is Christy.  Notified Christy Gordon at [facility] in [place]; [phone] ext [phone]. Fax [phone]. She shared they use [employer] (3rd party) and fax is [phone] for any DME/C-9 needs. Will follow.               04/15/24 1239   Service Assessment   Patient Orientation Alert and Oriented   Cognition Alert   History Provided By Patient   Primary Caregiver Self   Accompanied By/Relationship unaccompanied   Support Systems Spouse/Significant Other;Family Members;Children   Patient's Healthcare Decision Maker is: Legal Next of Kin   PCP Verified by CM Yes   Last

## 2024-04-15 NOTE — PLAN OF CARE
Problem: Discharge Planning  Goal: Discharge to home or other facility with appropriate resources  Outcome: Progressing  Flowsheets (Taken 4/15/2024 1834)  Discharge to home or other facility with appropriate resources: Identify barriers to discharge with patient and caregiver     Problem: Pain  Goal: Verbalizes/displays adequate comfort level or baseline comfort level  Outcome: Progressing  Flowsheets (Taken 4/15/2024 1834)  Verbalizes/displays adequate comfort level or baseline comfort level: Encourage patient to monitor pain and request assistance     Problem: Skin/Tissue Integrity  Goal: Absence of new skin breakdown  Description: 1.  Monitor for areas of redness and/or skin breakdown  2.  Assess vascular access sites hourly  3.  Every 4-6 hours minimum:  Change oxygen saturation probe site  4.  Every 4-6 hours:  If on nasal continuous positive airway pressure, respiratory therapy assess nares and determine need for appliance change or resting period.  Outcome: Progressing  Note: No evidence of new skin breakdown assessed this shift.      Problem: Safety - Adult  Goal: Free from fall injury  Outcome: Progressing  Flowsheets (Taken 4/15/2024 1834)  Free From Fall Injury: Instruct family/caregiver on patient safety     Problem: Musculoskeletal - Adult  Goal: Return mobility to safest level of function  Outcome: Progressing  Flowsheets (Taken 4/15/2024 1834)  Return Mobility to Safest Level of Function: Assess patient stability and activity tolerance for standing, transferring and ambulating with or without assistive devices     Problem: Gastrointestinal - Adult  Goal: Minimal or absence of nausea and vomiting  Outcome: Progressing  Flowsheets (Taken 4/15/2024 1834)  Minimal or absence of nausea and vomiting: Administer IV fluids as ordered to ensure adequate hydration     Problem: Genitourinary - Adult  Goal: Absence of urinary retention  Outcome: Progressing  Flowsheets (Taken 4/15/2024 1834)  Absence of

## 2024-04-15 NOTE — PROGRESS NOTES
Orthopaedic Progress Note      SUBJECTIVE   Mr. Howell is hospital day 2, L knee pain, DVT    Seen at bedside this morning  Pain had improved until mobilized around his room this morning, steadily improving   Complains of pain to the global left lower extremity, not to the knee    OBJECTIVE      Physical    VITALS:  /77   Pulse 83   Temp 98.8 °F (37.1 °C) (Oral)   Resp 17   Ht 1.727 m (5' 8\")   Wt 86.2 kg (190 lb)   SpO2 94%   BMI 28.89 kg/m²   I/O last 3 completed shifts:  In: -   Out: 1175 [Urine:1175]      5/10 pain  Gen: alert and oriented  Head: normorcephalic, atraumatic  Resp: unlabored, room air  Pelvis: stable  Extremity: LLE: Skin is intact, no signs of open or healing wounds present.  Knee range of motion significantly limited and guarded, this is improved since yesterday, pain elicited with ROM at the prepatellar bursa.  He is able to perform a straight leg raise. There is tenderness really throughout the left lower extremity beginning at the level of approximately the mid thigh. This extends distally into the foot, again this is somewhat improved per patient from examination yesterday.  No erythema noted at time of exam. There is swelling present throughout the lower extremity from the level of the mid thigh to the foot.  Effusion noted about the knee.  Warmth to palpation again throughout the entire left lower extremity when compared to the contralateral leg. NVI.       Data  CBC:   Lab Results   Component Value Date/Time    WBC 11.5 04/15/2024 05:31 AM    HGB 13.4 04/15/2024 05:31 AM     04/15/2024 05:31 AM     BMP:    Lab Results   Component Value Date/Time     04/15/2024 05:31 AM    K 4.6 04/15/2024 05:31 AM    CL 96 04/15/2024 05:31 AM    CO2 30 04/15/2024 05:31 AM    BUN 14 04/15/2024 05:31 AM    CREATININE 0.7 04/15/2024 05:31 AM    CALCIUM 8.4 04/15/2024 05:31 AM    GLUCOSE 108 04/15/2024 05:31 AM     Uric Acid:  No components found for: \"URIC\"  PT/INR:    Lab

## 2024-04-16 ENCOUNTER — ANESTHESIA EVENT (OUTPATIENT)
Dept: OPERATING ROOM | Age: 59
End: 2024-04-16
Payer: COMMERCIAL

## 2024-04-16 ENCOUNTER — ANESTHESIA (OUTPATIENT)
Dept: OPERATING ROOM | Age: 59
End: 2024-04-16
Payer: COMMERCIAL

## 2024-04-16 LAB
APTT PPP: 63.2 SECONDS (ref 22–38)
BASOPHILS ABSOLUTE: 0.1 THOU/MM3 (ref 0–0.1)
BASOPHILS NFR BLD AUTO: 0.7 %
CHARACTER SNV: ABNORMAL
COLOR SNV: ABNORMAL
CRYSTALS FLD MICRO: ABNORMAL
DEPRECATED RDW RBC AUTO: 48.7 FL (ref 35–45)
DEPRECATED RDW RBC AUTO: 49 FL (ref 35–45)
DEPRECATED RDW RBC AUTO: 49.6 FL (ref 35–45)
DEPRECATED RDW RBC AUTO: 50.6 FL (ref 35–45)
EOSINOPHIL NFR BLD AUTO: 2.4 %
EOSINOPHILS ABSOLUTE: 0.4 THOU/MM3 (ref 0–0.4)
ERYTHROCYTE [DISTWIDTH] IN BLOOD BY AUTOMATED COUNT: 13.5 % (ref 11.5–14.5)
ERYTHROCYTE [DISTWIDTH] IN BLOOD BY AUTOMATED COUNT: 13.6 % (ref 11.5–14.5)
ERYTHROCYTE [DISTWIDTH] IN BLOOD BY AUTOMATED COUNT: 13.7 % (ref 11.5–14.5)
ERYTHROCYTE [DISTWIDTH] IN BLOOD BY AUTOMATED COUNT: 13.8 % (ref 11.5–14.5)
GRANULOCYTES NFR FLD AUTO: 87.6 % (ref 0–24)
HCT VFR BLD AUTO: 38.3 % (ref 42–52)
HCT VFR BLD AUTO: 38.4 % (ref 42–52)
HCT VFR BLD AUTO: 38.7 % (ref 42–52)
HCT VFR BLD AUTO: 39.6 % (ref 42–52)
HEPARIN LOW MOLECULAR WEIGHT: 0.29 U/ML
HEPARIN UNFRACTIONATED: 0.12 U/ML (ref 0.3–0.7)
HEPARIN UNFRACTIONATED: 0.3 U/ML (ref 0.3–0.7)
HEPARIN UNFRACTIONATED: < 0.04 U/ML (ref 0.3–0.7)
HGB BLD-MCNC: 12.7 GM/DL (ref 14–18)
HGB BLD-MCNC: 13 GM/DL (ref 14–18)
HGB BLD-MCNC: 13.1 GM/DL (ref 14–18)
HGB BLD-MCNC: 13.1 GM/DL (ref 14–18)
INR PPP: 1.27 (ref 0.85–1.13)
LYMPHOCYTES ABSOLUTE: 2.9 THOU/MM3 (ref 1–4.8)
LYMPHOCYTES NFR BLD AUTO: 19.4 %
MCH RBC QN AUTO: 32.3 PG (ref 26–33)
MCH RBC QN AUTO: 32.6 PG (ref 26–33)
MCH RBC QN AUTO: 32.7 PG (ref 26–33)
MCH RBC QN AUTO: 33.4 PG (ref 26–33)
MCHC RBC AUTO-ENTMCNC: 33.1 GM/DL (ref 32.2–35.5)
MCHC RBC AUTO-ENTMCNC: 33.1 GM/DL (ref 32.2–35.5)
MCHC RBC AUTO-ENTMCNC: 33.6 GM/DL (ref 32.2–35.5)
MCHC RBC AUTO-ENTMCNC: 34.2 GM/DL (ref 32.2–35.5)
MCV RBC AUTO: 97.5 FL (ref 80–94)
MCV RBC AUTO: 97.7 FL (ref 80–94)
MCV RBC AUTO: 97.8 FL (ref 80–94)
MCV RBC AUTO: 98.7 FL (ref 80–94)
MONOCYTES ABSOLUTE: 1.5 THOU/MM3 (ref 0.4–1.3)
MONOCYTES NFR BLD AUTO: 9.9 %
MONONUC CELLS NFR FLD AUTO: 12.4 % (ref 0–74)
NEUTROPHILS NFR BLD AUTO: 62.6 %
NRBC BLD AUTO-RTO: 0 /100 WBC
NUC CELL # FLD AUTO: ABNORMAL /CUMM (ref 0–150)
PATHOLOGIST REVIEW: ABNORMAL
PLATELET # BLD AUTO: 403 THOU/MM3 (ref 130–400)
PLATELET # BLD AUTO: 419 THOU/MM3 (ref 130–400)
PLATELET # BLD AUTO: 437 THOU/MM3 (ref 130–400)
PLATELET # BLD AUTO: 441 THOU/MM3 (ref 130–400)
PLATELET BLD QL SMEAR: ADEQUATE
PMV BLD AUTO: 8.9 FL (ref 9.4–12.4)
PMV BLD AUTO: 9 FL (ref 9.4–12.4)
PMV BLD AUTO: 9.4 FL (ref 9.4–12.4)
PMV BLD AUTO: 9.7 FL (ref 9.4–12.4)
RBC # BLD AUTO: 3.89 MILL/MM3 (ref 4.7–6.1)
RBC # BLD AUTO: 3.92 MILL/MM3 (ref 4.7–6.1)
RBC # BLD AUTO: 3.97 MILL/MM3 (ref 4.7–6.1)
RBC # BLD AUTO: 4.05 MILL/MM3 (ref 4.7–6.1)
SEGMENTED NEUTROPHILS ABSOLUTE COUNT: 9.3 THOU/MM3 (ref 1.8–7.7)
TOTAL VOLUME RECEIVED SYNOVIAL: 60 ML
TOXIC GRANULES BLD QL SMEAR: PRESENT
WBC # BLD AUTO: 14 THOU/MM3 (ref 4.8–10.8)
WBC # BLD AUTO: 14.5 THOU/MM3 (ref 4.8–10.8)
WBC # BLD AUTO: 14.8 THOU/MM3 (ref 4.8–10.8)
WBC # BLD AUTO: 20.3 THOU/MM3 (ref 4.8–10.8)

## 2024-04-16 PROCEDURE — 87070 CULTURE OTHR SPECIMN AEROBIC: CPT

## 2024-04-16 PROCEDURE — 3700000000 HC ANESTHESIA ATTENDED CARE: Performed by: ORTHOPAEDIC SURGERY

## 2024-04-16 PROCEDURE — 7100000000 HC PACU RECOVERY - FIRST 15 MIN: Performed by: ORTHOPAEDIC SURGERY

## 2024-04-16 PROCEDURE — 2580000003 HC RX 258

## 2024-04-16 PROCEDURE — 7100000001 HC PACU RECOVERY - ADDTL 15 MIN: Performed by: ORTHOPAEDIC SURGERY

## 2024-04-16 PROCEDURE — 89050 BODY FLUID CELL COUNT: CPT

## 2024-04-16 PROCEDURE — 99233 SBSQ HOSP IP/OBS HIGH 50: CPT | Performed by: PHYSICIAN ASSISTANT

## 2024-04-16 PROCEDURE — 3700000001 HC ADD 15 MINUTES (ANESTHESIA): Performed by: ORTHOPAEDIC SURGERY

## 2024-04-16 PROCEDURE — 87075 CULTR BACTERIA EXCEPT BLOOD: CPT

## 2024-04-16 PROCEDURE — 6370000000 HC RX 637 (ALT 250 FOR IP): Performed by: PHYSICIAN ASSISTANT

## 2024-04-16 PROCEDURE — 6360000002 HC RX W HCPCS: Performed by: PHYSICIAN ASSISTANT

## 2024-04-16 PROCEDURE — 85520 HEPARIN ASSAY: CPT

## 2024-04-16 PROCEDURE — 85730 THROMBOPLASTIN TIME PARTIAL: CPT

## 2024-04-16 PROCEDURE — 6360000002 HC RX W HCPCS: Performed by: ANESTHESIOLOGY

## 2024-04-16 PROCEDURE — 6360000002 HC RX W HCPCS: Performed by: NURSE ANESTHETIST, CERTIFIED REGISTERED

## 2024-04-16 PROCEDURE — 89060 EXAM SYNOVIAL FLUID CRYSTALS: CPT

## 2024-04-16 PROCEDURE — 3600000012 HC SURGERY LEVEL 2 ADDTL 15MIN: Performed by: ORTHOPAEDIC SURGERY

## 2024-04-16 PROCEDURE — 2500000003 HC RX 250 WO HCPCS: Performed by: NURSE ANESTHETIST, CERTIFIED REGISTERED

## 2024-04-16 PROCEDURE — 1200000000 HC SEMI PRIVATE

## 2024-04-16 PROCEDURE — 87205 SMEAR GRAM STAIN: CPT

## 2024-04-16 PROCEDURE — 36415 COLL VENOUS BLD VENIPUNCTURE: CPT

## 2024-04-16 PROCEDURE — 2580000003 HC RX 258: Performed by: NURSE ANESTHETIST, CERTIFIED REGISTERED

## 2024-04-16 PROCEDURE — 85027 COMPLETE CBC AUTOMATED: CPT

## 2024-04-16 PROCEDURE — 85025 COMPLETE CBC W/AUTO DIFF WBC: CPT

## 2024-04-16 PROCEDURE — 0SBD0ZZ EXCISION OF LEFT KNEE JOINT, OPEN APPROACH: ICD-10-PCS | Performed by: ORTHOPAEDIC SURGERY

## 2024-04-16 PROCEDURE — 3600000002 HC SURGERY LEVEL 2 BASE: Performed by: ORTHOPAEDIC SURGERY

## 2024-04-16 PROCEDURE — 6360000002 HC RX W HCPCS: Performed by: EMERGENCY MEDICINE

## 2024-04-16 PROCEDURE — 85610 PROTHROMBIN TIME: CPT

## 2024-04-16 PROCEDURE — 6360000002 HC RX W HCPCS

## 2024-04-16 PROCEDURE — 2709999900 HC NON-CHARGEABLE SUPPLY: Performed by: ORTHOPAEDIC SURGERY

## 2024-04-16 RX ORDER — ONDANSETRON 2 MG/ML
INJECTION INTRAMUSCULAR; INTRAVENOUS PRN
Status: DISCONTINUED | OUTPATIENT
Start: 2024-04-16 | End: 2024-04-16 | Stop reason: SDUPTHER

## 2024-04-16 RX ORDER — SODIUM CHLORIDE 0.9 % (FLUSH) 0.9 %
5-40 SYRINGE (ML) INJECTION EVERY 12 HOURS SCHEDULED
Status: DISCONTINUED | OUTPATIENT
Start: 2024-04-16 | End: 2024-04-16 | Stop reason: HOSPADM

## 2024-04-16 RX ORDER — LABETALOL HYDROCHLORIDE 5 MG/ML
10 INJECTION, SOLUTION INTRAVENOUS
Status: DISCONTINUED | OUTPATIENT
Start: 2024-04-16 | End: 2024-04-16 | Stop reason: HOSPADM

## 2024-04-16 RX ORDER — HEPARIN SODIUM 1000 [USP'U]/ML
40 INJECTION, SOLUTION INTRAVENOUS; SUBCUTANEOUS PRN
Status: DISCONTINUED | OUTPATIENT
Start: 2024-04-16 | End: 2024-04-16 | Stop reason: SDUPTHER

## 2024-04-16 RX ORDER — ONDANSETRON 2 MG/ML
4 INJECTION INTRAMUSCULAR; INTRAVENOUS
Status: DISCONTINUED | OUTPATIENT
Start: 2024-04-16 | End: 2024-04-16 | Stop reason: HOSPADM

## 2024-04-16 RX ORDER — FENTANYL CITRATE 50 UG/ML
INJECTION, SOLUTION INTRAMUSCULAR; INTRAVENOUS
Status: COMPLETED
Start: 2024-04-16 | End: 2024-04-16

## 2024-04-16 RX ORDER — PROPOFOL 10 MG/ML
INJECTION, EMULSION INTRAVENOUS PRN
Status: DISCONTINUED | OUTPATIENT
Start: 2024-04-16 | End: 2024-04-16 | Stop reason: SDUPTHER

## 2024-04-16 RX ORDER — HEPARIN SODIUM 10000 [USP'U]/100ML
5-30 INJECTION, SOLUTION INTRAVENOUS CONTINUOUS
Status: DISCONTINUED | OUTPATIENT
Start: 2024-04-16 | End: 2024-04-16 | Stop reason: SDUPTHER

## 2024-04-16 RX ORDER — LIDOCAINE HCL/PF 100 MG/5ML
SYRINGE (ML) INJECTION PRN
Status: DISCONTINUED | OUTPATIENT
Start: 2024-04-16 | End: 2024-04-16 | Stop reason: SDUPTHER

## 2024-04-16 RX ORDER — MIDAZOLAM HYDROCHLORIDE 1 MG/ML
INJECTION INTRAMUSCULAR; INTRAVENOUS PRN
Status: DISCONTINUED | OUTPATIENT
Start: 2024-04-16 | End: 2024-04-16 | Stop reason: SDUPTHER

## 2024-04-16 RX ORDER — SODIUM CHLORIDE 9 MG/ML
INJECTION, SOLUTION INTRAVENOUS PRN
Status: DISCONTINUED | OUTPATIENT
Start: 2024-04-16 | End: 2024-04-16 | Stop reason: HOSPADM

## 2024-04-16 RX ORDER — DEXAMETHASONE SODIUM PHOSPHATE 10 MG/ML
INJECTION, EMULSION INTRAMUSCULAR; INTRAVENOUS PRN
Status: DISCONTINUED | OUTPATIENT
Start: 2024-04-16 | End: 2024-04-16 | Stop reason: SDUPTHER

## 2024-04-16 RX ORDER — HYDRALAZINE HYDROCHLORIDE 20 MG/ML
10 INJECTION INTRAMUSCULAR; INTRAVENOUS
Status: DISCONTINUED | OUTPATIENT
Start: 2024-04-16 | End: 2024-04-16 | Stop reason: HOSPADM

## 2024-04-16 RX ORDER — FENTANYL CITRATE 50 UG/ML
INJECTION, SOLUTION INTRAMUSCULAR; INTRAVENOUS PRN
Status: DISCONTINUED | OUTPATIENT
Start: 2024-04-16 | End: 2024-04-16 | Stop reason: SDUPTHER

## 2024-04-16 RX ORDER — NALOXONE HYDROCHLORIDE 0.4 MG/ML
INJECTION, SOLUTION INTRAMUSCULAR; INTRAVENOUS; SUBCUTANEOUS PRN
Status: DISCONTINUED | OUTPATIENT
Start: 2024-04-16 | End: 2024-04-16 | Stop reason: HOSPADM

## 2024-04-16 RX ORDER — SODIUM CHLORIDE 0.9 % (FLUSH) 0.9 %
5-40 SYRINGE (ML) INJECTION PRN
Status: DISCONTINUED | OUTPATIENT
Start: 2024-04-16 | End: 2024-04-16 | Stop reason: HOSPADM

## 2024-04-16 RX ORDER — FENTANYL CITRATE 50 UG/ML
50 INJECTION, SOLUTION INTRAMUSCULAR; INTRAVENOUS EVERY 5 MIN PRN
Status: DISCONTINUED | OUTPATIENT
Start: 2024-04-16 | End: 2024-04-16 | Stop reason: HOSPADM

## 2024-04-16 RX ORDER — HYDROMORPHONE HYDROCHLORIDE 2 MG/ML
INJECTION, SOLUTION INTRAMUSCULAR; INTRAVENOUS; SUBCUTANEOUS PRN
Status: DISCONTINUED | OUTPATIENT
Start: 2024-04-16 | End: 2024-04-16 | Stop reason: SDUPTHER

## 2024-04-16 RX ORDER — HEPARIN SODIUM 1000 [USP'U]/ML
80 INJECTION, SOLUTION INTRAVENOUS; SUBCUTANEOUS ONCE
Status: DISCONTINUED | OUTPATIENT
Start: 2024-04-16 | End: 2024-04-16 | Stop reason: SDUPTHER

## 2024-04-16 RX ORDER — SUCCINYLCHOLINE/SOD CL,ISO/PF 200MG/10ML
SYRINGE (ML) INTRAVENOUS PRN
Status: DISCONTINUED | OUTPATIENT
Start: 2024-04-16 | End: 2024-04-16 | Stop reason: SDUPTHER

## 2024-04-16 RX ORDER — SODIUM CHLORIDE, SODIUM LACTATE, POTASSIUM CHLORIDE, CALCIUM CHLORIDE 600; 310; 30; 20 MG/100ML; MG/100ML; MG/100ML; MG/100ML
INJECTION, SOLUTION INTRAVENOUS CONTINUOUS PRN
Status: DISCONTINUED | OUTPATIENT
Start: 2024-04-16 | End: 2024-04-16 | Stop reason: SDUPTHER

## 2024-04-16 RX ORDER — HEPARIN SODIUM 1000 [USP'U]/ML
80 INJECTION, SOLUTION INTRAVENOUS; SUBCUTANEOUS PRN
Status: DISCONTINUED | OUTPATIENT
Start: 2024-04-16 | End: 2024-04-16 | Stop reason: SDUPTHER

## 2024-04-16 RX ADMIN — FENTANYL CITRATE 50 MCG: 50 INJECTION INTRAMUSCULAR; INTRAVENOUS at 20:59

## 2024-04-16 RX ADMIN — FENTANYL CITRATE 50 MCG: 50 INJECTION, SOLUTION INTRAMUSCULAR; INTRAVENOUS at 20:14

## 2024-04-16 RX ADMIN — DEXAMETHASONE SODIUM PHOSPHATE 10 MG: 10 INJECTION, EMULSION INTRAMUSCULAR; INTRAVENOUS at 19:39

## 2024-04-16 RX ADMIN — FENTANYL CITRATE 100 MCG: 50 INJECTION, SOLUTION INTRAMUSCULAR; INTRAVENOUS at 19:57

## 2024-04-16 RX ADMIN — HYDROCODONE BITARTRATE AND ACETAMINOPHEN 2 TABLET: 5; 325 TABLET ORAL at 11:09

## 2024-04-16 RX ADMIN — MIDAZOLAM 2 MG: 1 INJECTION INTRAMUSCULAR; INTRAVENOUS at 19:49

## 2024-04-16 RX ADMIN — HYDROMORPHONE HYDROCHLORIDE 0.2 MG: 2 INJECTION INTRAMUSCULAR; INTRAVENOUS; SUBCUTANEOUS at 20:24

## 2024-04-16 RX ADMIN — Medication 140 MG: at 19:50

## 2024-04-16 RX ADMIN — HEPARIN SODIUM 30 UNITS/KG/HR: 10000 INJECTION, SOLUTION INTRAVENOUS at 01:13

## 2024-04-16 RX ADMIN — HYDROMORPHONE HYDROCHLORIDE 0.2 MG: 2 INJECTION INTRAMUSCULAR; INTRAVENOUS; SUBCUTANEOUS at 20:47

## 2024-04-16 RX ADMIN — CEFTRIAXONE SODIUM 1000 MG: 1 INJECTION, POWDER, FOR SOLUTION INTRAMUSCULAR; INTRAVENOUS at 06:37

## 2024-04-16 RX ADMIN — FENTANYL CITRATE 50 MCG: 50 INJECTION INTRAMUSCULAR; INTRAVENOUS at 21:04

## 2024-04-16 RX ADMIN — ONDANSETRON 4 MG: 2 INJECTION INTRAMUSCULAR; INTRAVENOUS at 19:39

## 2024-04-16 RX ADMIN — HYDROMORPHONE HYDROCHLORIDE 0.2 MG: 2 INJECTION INTRAMUSCULAR; INTRAVENOUS; SUBCUTANEOUS at 20:32

## 2024-04-16 RX ADMIN — SODIUM CHLORIDE, POTASSIUM CHLORIDE, SODIUM LACTATE AND CALCIUM CHLORIDE: 600; 310; 30; 20 INJECTION, SOLUTION INTRAVENOUS at 19:40

## 2024-04-16 RX ADMIN — PROPOFOL 200 MG: 10 INJECTION, EMULSION INTRAVENOUS at 19:50

## 2024-04-16 RX ADMIN — Medication 100 MG: at 19:51

## 2024-04-16 RX ADMIN — HYDROMORPHONE HYDROCHLORIDE 0.4 MG: 2 INJECTION INTRAMUSCULAR; INTRAVENOUS; SUBCUTANEOUS at 20:51

## 2024-04-16 RX ADMIN — HYDROMORPHONE HYDROCHLORIDE 0.4 MG: 2 INJECTION INTRAMUSCULAR; INTRAVENOUS; SUBCUTANEOUS at 20:28

## 2024-04-16 RX ADMIN — FENTANYL CITRATE 50 MCG: 50 INJECTION, SOLUTION INTRAMUSCULAR; INTRAVENOUS at 20:05

## 2024-04-16 RX ADMIN — HYDROMORPHONE HYDROCHLORIDE 0.4 MG: 2 INJECTION INTRAMUSCULAR; INTRAVENOUS; SUBCUTANEOUS at 20:20

## 2024-04-16 RX ADMIN — HEPARIN SODIUM 32 UNITS/KG/HR: 10000 INJECTION, SOLUTION INTRAVENOUS at 09:24

## 2024-04-16 RX ADMIN — HYDROCODONE BITARTRATE AND ACETAMINOPHEN 2 TABLET: 5; 325 TABLET ORAL at 06:04

## 2024-04-16 RX ADMIN — HYDROMORPHONE HYDROCHLORIDE 0.2 MG: 2 INJECTION INTRAMUSCULAR; INTRAVENOUS; SUBCUTANEOUS at 20:42

## 2024-04-16 ASSESSMENT — PAIN DESCRIPTION - LOCATION
LOCATION: LEG
LOCATION: LEG
LOCATION: KNEE
LOCATION: KNEE
LOCATION: LEG

## 2024-04-16 ASSESSMENT — PAIN DESCRIPTION - ONSET: ONSET: ON-GOING

## 2024-04-16 ASSESSMENT — PAIN SCALES - GENERAL
PAINLEVEL_OUTOF10: 8
PAINLEVEL_OUTOF10: 7
PAINLEVEL_OUTOF10: 2
PAINLEVEL_OUTOF10: 8
PAINLEVEL_OUTOF10: 5
PAINLEVEL_OUTOF10: 4

## 2024-04-16 ASSESSMENT — PAIN DESCRIPTION - FREQUENCY: FREQUENCY: CONTINUOUS

## 2024-04-16 ASSESSMENT — PAIN DESCRIPTION - DESCRIPTORS
DESCRIPTORS: ACHING
DESCRIPTORS: ACHING
DESCRIPTORS: SORE
DESCRIPTORS: ACHING;SHARP
DESCRIPTORS: ACHING

## 2024-04-16 ASSESSMENT — PAIN DESCRIPTION - ORIENTATION
ORIENTATION: LEFT

## 2024-04-16 ASSESSMENT — PAIN SCALES - WONG BAKER
WONGBAKER_NUMERICALRESPONSE: NO HURT

## 2024-04-16 ASSESSMENT — PAIN - FUNCTIONAL ASSESSMENT
PAIN_FUNCTIONAL_ASSESSMENT: WONG-BAKER FACES
PAIN_FUNCTIONAL_ASSESSMENT: PREVENTS OR INTERFERES SOME ACTIVE ACTIVITIES AND ADLS

## 2024-04-16 ASSESSMENT — PAIN DESCRIPTION - PAIN TYPE: TYPE: SURGICAL PAIN

## 2024-04-16 NOTE — PROGRESS NOTES
Patient arrives to the pre-op holding area. Pre-op checklist is performed and questions are addressed with patient and family at bedside.

## 2024-04-16 NOTE — PROGRESS NOTES
Hospitalist Progress Note    Patient:  Fran Howell      Unit/Bed:7K-10/010-A    YOB: 1965    MRN: [id number]       Acct: [id number]     PCP: Phi Cueto MD    Date of Admission: 4/12/2024    Assessment/Plan:    Left Leg Pain and Swelling with Left Knee Effusion: Ddimer 6512  Patient with reported knee injury prior to arrival and was wrapping his leg with coban with discoloration of toes noted on arrival. Wonder if patient possibly gave himself a blood clot due to this?   Doppler US with acute DVT of single peroneal vein in the left calf  Orthopedic surgery consulted and following  Heparin drip paused- Ortho performed arthrocentesis today of left knee effusion with purulent drainage noted, with WBC increasing despite abx, Ortho to take to OR possibly later today    Possible Cellulitis vs. Septic Arthritis of LLE: Patient with intermittent fevers- possibly 2/2 clot however redness reported upon arrival  CRP elevated  Blood cultures so far negative  Started on Ceftriaxone and Vancomycin upon admission  MRSA culture negative, Vancomycin dc'd  Orthopedic surgery consulted  Arthrocentesis L knee performed 4/16/24, patient possibly to go to the OR later today as drainage was purulent  WBC increased to 11.5 4/15/24, previously normal     Vomiting: Patient states that he has had several episodes of non-bloody emesis  IVF  PRN antiemetics     Dehydration likely 2/2 vomiting:   IVF    Chief Complaint: Left Leg Pain      Subjective: 58 y.o. male admitted to the hospitalist service for left leg pain. Patient rates his pain a 6/10 today. He denies chest pain. He denies SOB. He denies nausea or vomiting. Orthopedic surgery performed arthrocentesis, purulent drainage noted, patient to go to the OR possibly later today for concern of septic arthritis of the left knee.      Medications:    Infusion Medications    sodium chloride      heparin (PORCINE) Infusion 32 Units/kg/hr (04/16/24 0924)     Scheduled  limited to a single peroneal vein in the left calf.   2. Moderate size suprapatellar seroma or joint effusion.            **This report has been created using voice recognition software.  It may contain minor errors which are inherent in voice recognition technology.**      Final report electronically signed by Dr. Aman Jarrett on 4/15/2024 8:45 AM      XR TIBIA FIBULA LEFT (2 VIEWS)   Final Result   Impression:   1. No acute osseous abnormality.   2. Knee joint effusion.   3. Diffuse subcutaneous edema.      This document has been electronically signed by: Christian Olivas MD on    04/12/2024 11:12 PM      XR KNEE LEFT (MIN 4 VIEWS)   Final Result   Impression:   1. No acute osseous abnormality.   2. Moderate knee joint effusion.   3. Subcutaneous edema.      This document has been electronically signed by: Christian Olivas MD on    04/12/2024 11:15 PM          Diet: ADULT DIET; Regular    DVT prophylaxis: [] Lovenox                                 [] SCDs                                 [x] Heparin gtt                                 [] Encourage ambulation           [] Already on Anticoagulation     Disposition:    [x] Home       [] TCU       [] Rehab       [] Psych       [] SNF       [] Long Term Care Facility       [] Other-    Code Status: Full Code      Electronically signed by David Miller PA-C on 4/16/2024 at 9:26 AM     equal bilaterally/strong bilaterally

## 2024-04-16 NOTE — CARE COORDINATION
4/16/24, 2:42 PM EDT    DISCHARGE ON GOING EVALUATION    [facility] day: 1  Location: 7K-10/010-A Reason for admit: Left leg pain [M79.605]  Cellulitis of left lower extremity [L03.116]  Acute deep vein thrombosis (DVT) of proximal vein of left lower extremity (HCC) [I82.4Y2]  Acute deep vein thrombosis (DVT) of calf muscle vein of left lower extremity (HCC) [I82.462]     Procedures:   4/16 70cc cloudy fluid was aspirated from the superolateral portal of the left knee at Bedside       Imaging since last note: none     Barriers to Discharge: Hospitalist and Ortho following. 70cc fluid aspirated from left knee today. NWB LLE. IV rocephin q24h. Norco prn.IV morphine prn. NPO. Heparin gtt held; plan for OR this evening.     PCP: Phi Cueto MD  Readmission Risk Score: 7.4%    Patient Goals/Plan/Treatment Preferences: Home w/ wife. His sister is helping him get a hospital bed through the [employer]. Needs unknown at this time.     Worker's Comp Case: Christy Gordon, Rep at [facility] in [place]; [phone] ext [phone]. Fax [phone]. She shared they use [employer] (3rd party) and fax is [phone] for any DME/C-9 needs.

## 2024-04-16 NOTE — ANESTHESIA PRE PROCEDURE
Department of Anesthesiology  Preprocedure Note       Name:  Fran Howell   Age:  58 y.o.  :  1965                                          MRN:  [id number]         Date:  2024      Surgeon: Surgeon(s):  Frederick Chadwick DO    Procedure: Procedure(s):  Left Knee I&D    Medications prior to admission:   Prior to Admission medications    Medication Sig Start Date End Date Taking? Authorizing Provider   acetaminophen (TYLENOL) 325 MG tablet Take 2 tablets by mouth every 6 hours as needed for Pain   Yes ProviderHenry MD   HYDROcodone-acetaminophen (NORCO) 5-325 MG per tablet Take 1 tablet by mouth every 6 hours as needed for Pain. Max Daily Amount: 4 tablets   Yes Henry Arguello MD   ibuprofen (ADVIL;MOTRIN) 200 MG tablet Take 1 tablet by mouth every 6 hours as needed for Pain   Yes ProviderHenry MD       Current medications:    Current Facility-Administered Medications   Medication Dose Route Frequency Provider Last Rate Last Admin   • calcium carbonate (TUMS) chewable tablet 500 mg  500 mg Oral TID PRN David Miller PA-C       • sodium chloride flush 0.9 % injection 5-40 mL  5-40 mL IntraVENous 2 times per day Chivo Nava APRN - CNP   10 mL at 24 0754   • sodium chloride flush 0.9 % injection 5-40 mL  5-40 mL IntraVENous PRN Chivo Nava APRN - CNP       • 0.9 % sodium chloride infusion   IntraVENous PRN Chivo Nava APRN - CNP       • potassium chloride (KLOR-CON M) extended release tablet 40 mEq  40 mEq Oral PRN Chivo Nava APRN - CNP        Or   • potassium bicarb-citric acid (EFFER-K) effervescent tablet 40 mEq  40 mEq Oral PRN Chivo Nava APRN - CNP        Or   • potassium chloride 10 mEq/100 mL IVPB (Peripheral Line)  10 mEq IntraVENous PRN Chivo Nava APRN - CNP       • magnesium sulfate 2000 mg in 50 mL IVPB premix  2,000 mg IntraVENous PRN Chivo Nava APRN - CNP       • ondansetron (ZOFRAN-ODT) disintegrating tablet 4 mg  4

## 2024-04-16 NOTE — PROGRESS NOTES
Orthopaedic Progress Note      SUBJECTIVE   Mr. Howell is hospital day 3, L knee pain, DVT    Seen at bedside this morning  Pain remains constant with mobilization, relieved at rest  Global left anterior and posterior knee pain, calf pain with minimal improvement as well  WBC 14  X1 fever yesterday  No nausea, vomiting, chills  Overall feeling fatigued    OBJECTIVE      Physical    VITALS:  /79   Pulse 98   Temp 99.7 °F (37.6 °C) (Oral)   Resp 18   Ht 1.727 m (5' 8\")   Wt 86.2 kg (190 lb)   SpO2 96%   BMI 28.89 kg/m²   I/O last 3 completed shifts:  In: 2100 [P.O.:2100]  Out: 5580 [Urine:5580]      7/10 pain  Gen: alert and oriented  Head: normorcephalic, atraumatic  Resp: unlabored, room air  Pelvis: stable  Extremity: LLE: Skin is intact, no signs of open or healing wounds present.  Knee range of motion significantly limited and guarded, pain elicited with ROM at the prepatellar bursa.  He is able to perform a straight leg raise. There is tenderness really throughout the left lower extremity beginning at the level of approximately the mid thigh. This extends distally into the foot, again this is somewhat improved per patient from examination yesterday.  No erythema noted at time of exam. There is swelling present throughout the lower extremity from the level of the mid thigh to the foot, this has somewhat improved.  Effusion noted about the knee.  Warmth to palpation again throughout the entire left lower extremity when compared to the contralateral leg. NVI.       Data  CBC:   Lab Results   Component Value Date/Time    WBC 11.5 04/15/2024 05:31 AM    HGB 13.4 04/15/2024 05:31 AM     04/15/2024 05:31 AM     BMP:    Lab Results   Component Value Date/Time     04/15/2024 05:31 AM    K 4.6 04/15/2024 05:31 AM    CL 96 04/15/2024 05:31 AM    CO2 30 04/15/2024 05:31 AM    BUN 14 04/15/2024 05:31 AM    CREATININE 0.7 04/15/2024 05:31 AM    CALCIUM 8.4 04/15/2024 05:31 AM    GLUCOSE 108  04/15/2024 05:31 AM     Uric Acid:  No components found for: \"URIC\"  PT/INR:    Lab Results   Component Value Date/Time    INR 1.14 04/12/2024 10:40 PM     Troponin:  No results found for: \"TROPONINI\"  Urine Culture:  No components found for: \"CURINE\"      Current Inpatient Medications    Current Facility-Administered Medications: calcium carbonate (TUMS) chewable tablet 500 mg, 500 mg, Oral, TID PRN  sodium chloride flush 0.9 % injection 5-40 mL, 5-40 mL, IntraVENous, 2 times per day  sodium chloride flush 0.9 % injection 5-40 mL, 5-40 mL, IntraVENous, PRN  0.9 % sodium chloride infusion, , IntraVENous, PRN  potassium chloride (KLOR-CON M) extended release tablet 40 mEq, 40 mEq, Oral, PRN **OR** potassium bicarb-citric acid (EFFER-K) effervescent tablet 40 mEq, 40 mEq, Oral, PRN **OR** potassium chloride 10 mEq/100 mL IVPB (Peripheral Line), 10 mEq, IntraVENous, PRN  magnesium sulfate 2000 mg in 50 mL IVPB premix, 2,000 mg, IntraVENous, PRN  ondansetron (ZOFRAN-ODT) disintegrating tablet 4 mg, 4 mg, Oral, Q8H PRN **OR** ondansetron (ZOFRAN) injection 4 mg, 4 mg, IntraVENous, Q6H PRN  polyethylene glycol (GLYCOLAX) packet 17 g, 17 g, Oral, Daily PRN  acetaminophen (TYLENOL) tablet 650 mg, 650 mg, Oral, Q6H PRN **OR** acetaminophen (TYLENOL) suppository 650 mg, 650 mg, Rectal, Q6H PRN  cefTRIAXone (ROCEPHIN) 1,000 mg in sodium chloride 0.9 % 50 mL IVPB (mini-bag), 1,000 mg, IntraVENous, Q24H  morphine (PF) injection 1 mg, 1 mg, IntraVENous, Q2H PRN **OR** morphine (PF) injection 2 mg, 2 mg, IntraVENous, Q2H PRN  diphenhydrAMINE (BENADRYL) tablet 25 mg, 25 mg, Oral, Q6H PRN  HYDROcodone-acetaminophen (NORCO) 5-325 MG per tablet 1 tablet, 1 tablet, Oral, Q4H PRN **OR** HYDROcodone-acetaminophen (NORCO) 5-325 MG per tablet 2 tablet, 2 tablet, Oral, Q4H PRN  heparin (porcine) injection 6,900 Units, 80 Units/kg, IntraVENous, PRN  heparin (porcine) injection 3,450 Units, 40 Units/kg, IntraVENous, PRN  heparin 25,000 units  in dextrose 5% 250 mL (premix) infusion, 5-32 Units/kg/hr, IntraVENous, Continuous        PLAN    Mr. Howell is hospital day 3, L knee pain, DVT    CBC with changes despite IV abx  X1 fever yesterday  Discussed role of aspiration for diagnostic and therapeutic measures, risks benefits alternatives addressed, expressed understanding and agreeable to proceed    Tolerated aspiration well, 70cc cloudy fluid was aspirated from the superolateral portal of the left knee  Compressive dressing applied  Possibility of arthroscopy of the left knee addressed with patient pending aspiration results, expressed understanding    Hold heparin  NPO  NWB LLE  Continue compressive dressing, ice, elevation       Aspiration results reviewed. Consistent with Infection of ;eft knee. To OR for Irrigation of joint. Pt marked and consented.     Electronically signed by Frederick Chadwick DO on 4/16/2024 at 7:57 PM

## 2024-04-16 NOTE — PLAN OF CARE
Problem: Discharge Planning  Goal: Discharge to home or other facility with appropriate resources  Outcome: Progressing  Flowsheets (Taken 4/15/2024 2133 by Sridhar Cadet RN)  Discharge to home or other facility with appropriate resources:   Identify barriers to discharge with patient and caregiver   Arrange for needed discharge resources and transportation as appropriate     Problem: Pain  Goal: Verbalizes/displays adequate comfort level or baseline comfort level  Outcome: Progressing  Flowsheets (Taken 4/15/2024 1834 by Ashley Palmer, JUNIE)  Verbalizes/displays adequate comfort level or baseline comfort level: Encourage patient to monitor pain and request assistance     Problem: Skin/Tissue Integrity  Goal: Absence of new skin breakdown  Description: 1.  Monitor for areas of redness and/or skin breakdown  2.  Assess vascular access sites hourly  3.  Every 4-6 hours minimum:  Change oxygen saturation probe site  4.  Every 4-6 hours:  If on nasal continuous positive airway pressure, respiratory therapy assess nares and determine need for appliance change or resting period.  Outcome: Progressing     Problem: Safety - Adult  Goal: Free from fall injury  Outcome: Progressing  Flowsheets (Taken 4/15/2024 1834 by Ashley Palmer RN)  Free From Fall Injury: Instruct family/caregiver on patient safety     Problem: Musculoskeletal - Adult  Goal: Return mobility to safest level of function  Outcome: Progressing  Flowsheets (Taken 4/15/2024 2133 by Sridhar Cadet RN)  Return Mobility to Safest Level of Function:   Assess patient stability and activity tolerance for standing, transferring and ambulating with or without assistive devices   Assist with transfers and ambulation using safe patient handling equipment as needed     Problem: Gastrointestinal - Adult  Goal: Minimal or absence of nausea and vomiting  Outcome: Progressing  Flowsheets (Taken 4/15/2024 2133 by Sridhar Cadet, RN)  Minimal or absence of nausea and vomiting:

## 2024-04-17 LAB
CHARACTER SNV: ABNORMAL
COLOR SNV: ABNORMAL
CRYSTALS FLD MICRO: ABNORMAL
DEPRECATED RDW RBC AUTO: 47.8 FL (ref 35–45)
DEPRECATED RDW RBC AUTO: 47.8 FL (ref 35–45)
DEPRECATED RDW RBC AUTO: 49.3 FL (ref 35–45)
DEPRECATED RDW RBC AUTO: 50.3 FL (ref 35–45)
EKG ATRIAL RATE: 108 BPM
EKG P AXIS: 56 DEGREES
EKG P-R INTERVAL: 120 MS
EKG Q-T INTERVAL: 294 MS
EKG QRS DURATION: 90 MS
EKG QTC CALCULATION (BAZETT): 393 MS
EKG R AXIS: 50 DEGREES
EKG T AXIS: 60 DEGREES
EKG VENTRICULAR RATE: 108 BPM
ERYTHROCYTE [DISTWIDTH] IN BLOOD BY AUTOMATED COUNT: 13.2 % (ref 11.5–14.5)
ERYTHROCYTE [DISTWIDTH] IN BLOOD BY AUTOMATED COUNT: 13.3 % (ref 11.5–14.5)
ERYTHROCYTE [DISTWIDTH] IN BLOOD BY AUTOMATED COUNT: 13.4 % (ref 11.5–14.5)
ERYTHROCYTE [DISTWIDTH] IN BLOOD BY AUTOMATED COUNT: 13.6 % (ref 11.5–14.5)
GRANULOCYTES NFR FLD AUTO: 93.3 % (ref 0–24)
HCT VFR BLD AUTO: 34.7 % (ref 42–52)
HCT VFR BLD AUTO: 34.9 % (ref 42–52)
HCT VFR BLD AUTO: 38.1 % (ref 42–52)
HCT VFR BLD AUTO: 39.8 % (ref 42–52)
HEPARIN UNFRACTIONATED: 0.46 U/ML (ref 0.3–0.7)
HEPARIN UNFRACTIONATED: 0.56 U/ML (ref 0.3–0.7)
HEPARIN UNFRACTIONATED: 0.64 U/ML (ref 0.3–0.7)
HEPARIN UNFRACTIONATED: < 0.04 U/ML (ref 0.3–0.7)
HGB BLD-MCNC: 11.6 GM/DL (ref 14–18)
HGB BLD-MCNC: 11.7 GM/DL (ref 14–18)
HGB BLD-MCNC: 12.5 GM/DL (ref 14–18)
HGB BLD-MCNC: 13.2 GM/DL (ref 14–18)
INR PPP: 1.26 (ref 0.85–1.13)
MCH RBC QN AUTO: 32.4 PG (ref 26–33)
MCH RBC QN AUTO: 32.8 PG (ref 26–33)
MCH RBC QN AUTO: 33.1 PG (ref 26–33)
MCH RBC QN AUTO: 33.2 PG (ref 26–33)
MCHC RBC AUTO-ENTMCNC: 32.8 GM/DL (ref 32.2–35.5)
MCHC RBC AUTO-ENTMCNC: 33.2 GM/DL (ref 32.2–35.5)
MCHC RBC AUTO-ENTMCNC: 33.4 GM/DL (ref 32.2–35.5)
MCHC RBC AUTO-ENTMCNC: 33.5 GM/DL (ref 32.2–35.5)
MCV RBC AUTO: 100 FL (ref 80–94)
MCV RBC AUTO: 100.3 FL (ref 80–94)
MCV RBC AUTO: 96.9 FL (ref 80–94)
MCV RBC AUTO: 98.6 FL (ref 80–94)
MONONUC CELLS NFR FLD AUTO: 6.7 % (ref 0–74)
NUC CELL # FLD AUTO: ABNORMAL /CUMM (ref 0–150)
PATHOLOGIST REVIEW: ABNORMAL
PLATELET # BLD AUTO: 458 THOU/MM3 (ref 130–400)
PLATELET # BLD AUTO: 476 THOU/MM3 (ref 130–400)
PLATELET # BLD AUTO: 485 THOU/MM3 (ref 130–400)
PLATELET # BLD AUTO: 514 THOU/MM3 (ref 130–400)
PMV BLD AUTO: 9.2 FL (ref 9.4–12.4)
PMV BLD AUTO: 9.2 FL (ref 9.4–12.4)
PMV BLD AUTO: 9.3 FL (ref 9.4–12.4)
PMV BLD AUTO: 9.4 FL (ref 9.4–12.4)
RBC # BLD AUTO: 3.54 MILL/MM3 (ref 4.7–6.1)
RBC # BLD AUTO: 3.58 MILL/MM3 (ref 4.7–6.1)
RBC # BLD AUTO: 3.81 MILL/MM3 (ref 4.7–6.1)
RBC # BLD AUTO: 3.97 MILL/MM3 (ref 4.7–6.1)
TOTAL VOLUME RECEIVED SYNOVIAL: 20 ML
WBC # BLD AUTO: 21 THOU/MM3 (ref 4.8–10.8)
WBC # BLD AUTO: 22.6 THOU/MM3 (ref 4.8–10.8)
WBC # BLD AUTO: 26.3 THOU/MM3 (ref 4.8–10.8)
WBC # BLD AUTO: 26.9 THOU/MM3 (ref 4.8–10.8)

## 2024-04-17 PROCEDURE — 6360000002 HC RX W HCPCS: Performed by: PHYSICIAN ASSISTANT

## 2024-04-17 PROCEDURE — 2580000003 HC RX 258: Performed by: PHYSICIAN ASSISTANT

## 2024-04-17 PROCEDURE — 36415 COLL VENOUS BLD VENIPUNCTURE: CPT

## 2024-04-17 PROCEDURE — 85610 PROTHROMBIN TIME: CPT

## 2024-04-17 PROCEDURE — 99233 SBSQ HOSP IP/OBS HIGH 50: CPT | Performed by: PHYSICIAN ASSISTANT

## 2024-04-17 PROCEDURE — 1200000000 HC SEMI PRIVATE

## 2024-04-17 PROCEDURE — 85520 HEPARIN ASSAY: CPT

## 2024-04-17 PROCEDURE — 85027 COMPLETE CBC AUTOMATED: CPT

## 2024-04-17 PROCEDURE — 6370000000 HC RX 637 (ALT 250 FOR IP): Performed by: PHYSICIAN ASSISTANT

## 2024-04-17 RX ORDER — HEPARIN SODIUM 1000 [USP'U]/ML
40 INJECTION, SOLUTION INTRAVENOUS; SUBCUTANEOUS PRN
Status: DISCONTINUED | OUTPATIENT
Start: 2024-04-17 | End: 2024-04-17 | Stop reason: SDUPTHER

## 2024-04-17 RX ORDER — SENNA AND DOCUSATE SODIUM 50; 8.6 MG/1; MG/1
2 TABLET, FILM COATED ORAL DAILY
Status: DISCONTINUED | OUTPATIENT
Start: 2024-04-17 | End: 2024-04-20 | Stop reason: HOSPADM

## 2024-04-17 RX ORDER — HEPARIN SODIUM 1000 [USP'U]/ML
80 INJECTION, SOLUTION INTRAVENOUS; SUBCUTANEOUS PRN
Status: DISCONTINUED | OUTPATIENT
Start: 2024-04-17 | End: 2024-04-17 | Stop reason: SDUPTHER

## 2024-04-17 RX ORDER — HEPARIN SODIUM 10000 [USP'U]/100ML
5-30 INJECTION, SOLUTION INTRAVENOUS CONTINUOUS
Status: DISCONTINUED | OUTPATIENT
Start: 2024-04-17 | End: 2024-04-17 | Stop reason: SDUPTHER

## 2024-04-17 RX ORDER — HEPARIN SODIUM 1000 [USP'U]/ML
80 INJECTION, SOLUTION INTRAVENOUS; SUBCUTANEOUS ONCE
Status: COMPLETED | OUTPATIENT
Start: 2024-04-17 | End: 2024-04-17

## 2024-04-17 RX ADMIN — SODIUM CHLORIDE, PRESERVATIVE FREE 10 ML: 5 INJECTION INTRAVENOUS at 01:06

## 2024-04-17 RX ADMIN — CEFTRIAXONE SODIUM 1000 MG: 1 INJECTION, POWDER, FOR SOLUTION INTRAMUSCULAR; INTRAVENOUS at 07:12

## 2024-04-17 RX ADMIN — DOCUSATE SODIUM 50 MG AND SENNOSIDES 8.6 MG 2 TABLET: 8.6; 5 TABLET, FILM COATED ORAL at 20:11

## 2024-04-17 RX ADMIN — HEPARIN SODIUM 6900 UNITS: 1000 INJECTION INTRAVENOUS; SUBCUTANEOUS at 01:51

## 2024-04-17 RX ADMIN — HYDROCODONE BITARTRATE AND ACETAMINOPHEN 2 TABLET: 5; 325 TABLET ORAL at 08:36

## 2024-04-17 RX ADMIN — HEPARIN SODIUM 32 UNITS/KG/HR: 10000 INJECTION, SOLUTION INTRAVENOUS at 00:20

## 2024-04-17 RX ADMIN — SODIUM CHLORIDE, PRESERVATIVE FREE 10 ML: 5 INJECTION INTRAVENOUS at 20:05

## 2024-04-17 RX ADMIN — ANTACID TABLETS 500 MG: 500 TABLET, CHEWABLE ORAL at 16:51

## 2024-04-17 RX ADMIN — ONDANSETRON 4 MG: 4 TABLET, ORALLY DISINTEGRATING ORAL at 22:18

## 2024-04-17 RX ADMIN — HYDROCODONE BITARTRATE AND ACETAMINOPHEN 2 TABLET: 5; 325 TABLET ORAL at 22:18

## 2024-04-17 RX ADMIN — HYDROCODONE BITARTRATE AND ACETAMINOPHEN 2 TABLET: 5; 325 TABLET ORAL at 02:11

## 2024-04-17 RX ADMIN — HYDROCODONE BITARTRATE AND ACETAMINOPHEN 2 TABLET: 5; 325 TABLET ORAL at 14:03

## 2024-04-17 RX ADMIN — HEPARIN SODIUM 32 UNITS/KG/HR: 10000 INJECTION, SOLUTION INTRAVENOUS at 08:38

## 2024-04-17 RX ADMIN — HEPARIN SODIUM 32 UNITS/KG/HR: 10000 INJECTION, SOLUTION INTRAVENOUS at 17:54

## 2024-04-17 ASSESSMENT — PAIN SCALES - GENERAL
PAINLEVEL_OUTOF10: 8
PAINLEVEL_OUTOF10: 7
PAINLEVEL_OUTOF10: 5
PAINLEVEL_OUTOF10: 6
PAINLEVEL_OUTOF10: 0
PAINLEVEL_OUTOF10: 7

## 2024-04-17 ASSESSMENT — PAIN DESCRIPTION - FREQUENCY
FREQUENCY: INTERMITTENT
FREQUENCY: CONTINUOUS
FREQUENCY: INTERMITTENT

## 2024-04-17 ASSESSMENT — PAIN DESCRIPTION - ORIENTATION
ORIENTATION: LEFT

## 2024-04-17 ASSESSMENT — PAIN DESCRIPTION - LOCATION
LOCATION: LEG

## 2024-04-17 ASSESSMENT — PAIN DESCRIPTION - DESCRIPTORS
DESCRIPTORS: ACHING

## 2024-04-17 ASSESSMENT — PAIN DESCRIPTION - ONSET
ONSET: ON-GOING

## 2024-04-17 ASSESSMENT — PAIN - FUNCTIONAL ASSESSMENT
PAIN_FUNCTIONAL_ASSESSMENT: PREVENTS OR INTERFERES SOME ACTIVE ACTIVITIES AND ADLS
PAIN_FUNCTIONAL_ASSESSMENT: ACTIVITIES ARE NOT PREVENTED
PAIN_FUNCTIONAL_ASSESSMENT: PREVENTS OR INTERFERES SOME ACTIVE ACTIVITIES AND ADLS

## 2024-04-17 ASSESSMENT — PAIN DESCRIPTION - PAIN TYPE
TYPE: ACUTE PAIN
TYPE: ACUTE PAIN
TYPE: SURGICAL PAIN

## 2024-04-17 NOTE — CONSULTS
CONSULTATION NOTE :ID       Patient - Fran Howell,  Age - 58 y.o.    - 1965      Room Number - 7K-10/010-A   MRN -  [id number]   [facility] # - [id number]  Date of Admission -  2024  9:37 PM  Patient's PCP: Phi Cueto MD     Requesting Physician: Susanne Lau PA-C    REASON FOR CONSULTATION   Septic left knee  CHIEF COMPLAINT   Pain on his left knee    HISTORY OF PRESENT ILLNESS       This is a very pleasant 58 y.o. male who was admitted to the hospital with a chief complaints of pain and swelling on his left knee.  He reports slipping at work and he did not think much about it however he he continued to have more pain and swelling for which reason he came to the hospital.  He had surgery today he had purulent fluid.  Started on antibiotics.  He had no previous operation on his left knee he has no significant past medical history.    PAST MEDICAL  HISTORY       Past Medical History:   Diagnosis Date    Inguinal hernia 2021    left       PAST SURGICAL HISTORY     Past Surgical History:   Procedure Laterality Date    HERNIA REPAIR Right     age 23 Dr Banda    INGUINAL HERNIA REPAIR Left 2021         MEDICATIONS:       Scheduled Meds:   sennosides-docusate sodium  2 tablet Oral Daily    sodium chloride flush  5-40 mL IntraVENous 2 times per day     Continuous Infusions:   sodium chloride      heparin (PORCINE) Infusion 32 Units/kg/hr (24 2099)     PRN Meds:calcium carbonate, sodium chloride flush, sodium chloride, potassium chloride **OR** potassium alternative oral replacement **OR** potassium chloride, magnesium sulfate, ondansetron **OR** ondansetron, polyethylene glycol, acetaminophen **OR** acetaminophen, morphine **OR** morphine, diphenhydrAMINE, HYDROcodone 5 mg - acetaminophen **OR** HYDROcodone 5 mg - acetaminophen, heparin (porcine), heparin (porcine)  Allergies:   ALLERGIES:    Amoxicillin        SOCIAL HISTORY:     TOBACCO:   reports that he

## 2024-04-17 NOTE — BRIEF OP NOTE
Brief Postoperative Note      Patient: Fran Howell  YOB: 1965  MRN: [id number]    Date of Procedure: 4/16/2024    Pre-Op Diagnosis Codes:     * Septic arthritis, due to unspecified organism, septic arthritis of unspecified location (HCC) [M00.9]    Post-Op Diagnosis: Same       Procedure(s):  Left Knee Incision and Drainage    Surgeon(s):  Frederick Chadwick DO    Assistant:  Physician Assistant: Johan Nowak PA-C    Anesthesia: General    Estimated Blood Loss (mL): Minimal    Complications: None    Specimens:   ID Type Source Tests Collected by Time Destination   1 : Left Knee Joint Fluid Body Fluid Joint, Knee CULTURE, ANAEROBIC AND AEROBIC Frederick Chadwick DO 4/16/2024 2013        Implants:  * No implants in log *      Drains:   Closed/Suction Drain Left;Anterior Knee Accordion (Active)   Site Description Clean, dry & intact 04/16/24 2049   Dressing Status Clean, dry & intact 04/16/24 2049   Drainage Appearance Bloody 04/16/24 2049   Drain Status Compressed 04/16/24 2049       Findings:  Infection Present At Time Of Surgery (PATOS) (choose all levels that have infection present):  - Deep Infection (muscle/fascia) present as evidenced by purulent fluid  Other Findings: Postop diagnosis confirmed    Electronically signed by Johan Nowak PA-C on 4/16/2024 at 8:57 PM

## 2024-04-17 NOTE — PROGRESS NOTES
2049 pt arrived to PACU, unresponsive on arrival. OPA in place. Dressing CDI. VSS  2055 pt awake in bed, OPA removed. VSS  2059 c/o pain 8/10, medicated with 50 mcg fentanyl  2104 no change in pain status, medicated with 50 mcg fentanyl  2109 pt resting, resp easy. Attempted to call report, no answer  2118 attempted to call report, no answer. Called charge RN and states will have nurse call back for report  2120 pt resting, resp easy  2124 meets criteria for discharge from PACU   2126 transported to [facility] in stable condition

## 2024-04-17 NOTE — PROGRESS NOTES
Orthopaedic Progress Note      SUBJECTIVE   Mr. Howell is post op day # 1 L knee ID    Seen at bedside this morning  no adverse events overnight  Pain well controlled, tolerating oral diet  Denies any numbness/paresthesia in operative extremity  To mobilize today  No nausea vomiting      OBJECTIVE      Physical    VITALS:  BP (!) 143/85   Pulse 96   Temp 97.7 °F (36.5 °C) (Oral)   Resp 16   Ht 1.727 m (5' 8\")   Wt 86.2 kg (190 lb)   SpO2 96%   BMI 28.89 kg/m²   I/O last 3 completed shifts:  In: 1800 [P.O.:1800]  Out: 4675 [Urine:4675]    5/10 pain  Gen: alert and oriented  Head: normorcephalic, atraumatic  Resp: unlabored, room air  Pelvis: stable  LLE: incision c/d/I, no drainage, no bandage saturation  Drain x 1 intact, about 10 in canister, serosanguinous output   Sensation to light touch intact  Gastroc TA EHL intact  Distal pulses palpable, warm well perfused  Calf supple nontender to palpation       Data  CBC:   Lab Results   Component Value Date/Time    WBC 21.0 04/17/2024 03:20 AM    HGB 13.2 04/17/2024 03:20 AM     04/17/2024 03:20 AM     BMP:    Lab Results   Component Value Date/Time     04/15/2024 05:31 AM    K 4.6 04/15/2024 05:31 AM    CL 96 04/15/2024 05:31 AM    CO2 30 04/15/2024 05:31 AM    BUN 14 04/15/2024 05:31 AM    CREATININE 0.7 04/15/2024 05:31 AM    CALCIUM 8.4 04/15/2024 05:31 AM    GLUCOSE 108 04/15/2024 05:31 AM     Uric Acid:  No components found for: \"URIC\"  PT/INR:    Lab Results   Component Value Date/Time    INR 1.26 04/17/2024 12:51 AM     Troponin:  No results found for: \"TROPONINI\"  Urine Culture:  No components found for: \"CURINE\"      Current Inpatient Medications    Current Facility-Administered Medications: heparin (porcine) injection 6,900 Units, 80 Units/kg, IntraVENous, PRN  heparin (porcine) injection 3,450 Units, 40 Units/kg, IntraVENous, PRN  heparin 25,000 units in dextrose 5% 250 mL (premix) infusion, 5-30 Units/kg/hr, IntraVENous,  Continuous  cefTRIAXone (ROCEPHIN) 1,000 mg in sodium chloride 0.9 % 50 mL IVPB (mini-bag), 1,000 mg, IntraVENous, Q24H  calcium carbonate (TUMS) chewable tablet 500 mg, 500 mg, Oral, TID PRN  sodium chloride flush 0.9 % injection 5-40 mL, 5-40 mL, IntraVENous, 2 times per day  sodium chloride flush 0.9 % injection 5-40 mL, 5-40 mL, IntraVENous, PRN  0.9 % sodium chloride infusion, , IntraVENous, PRN  potassium chloride (KLOR-CON M) extended release tablet 40 mEq, 40 mEq, Oral, PRN **OR** potassium bicarb-citric acid (EFFER-K) effervescent tablet 40 mEq, 40 mEq, Oral, PRN **OR** potassium chloride 10 mEq/100 mL IVPB (Peripheral Line), 10 mEq, IntraVENous, PRN  magnesium sulfate 2000 mg in 50 mL IVPB premix, 2,000 mg, IntraVENous, PRN  ondansetron (ZOFRAN-ODT) disintegrating tablet 4 mg, 4 mg, Oral, Q8H PRN **OR** ondansetron (ZOFRAN) injection 4 mg, 4 mg, IntraVENous, Q6H PRN  polyethylene glycol (GLYCOLAX) packet 17 g, 17 g, Oral, Daily PRN  acetaminophen (TYLENOL) tablet 650 mg, 650 mg, Oral, Q6H PRN **OR** acetaminophen (TYLENOL) suppository 650 mg, 650 mg, Rectal, Q6H PRN  morphine (PF) injection 1 mg, 1 mg, IntraVENous, Q2H PRN **OR** morphine (PF) injection 2 mg, 2 mg, IntraVENous, Q2H PRN  diphenhydrAMINE (BENADRYL) tablet 25 mg, 25 mg, Oral, Q6H PRN  HYDROcodone-acetaminophen (NORCO) 5-325 MG per tablet 1 tablet, 1 tablet, Oral, Q4H PRN **OR** HYDROcodone-acetaminophen (NORCO) 5-325 MG per tablet 2 tablet, 2 tablet, Oral, Q4H PRN  heparin (porcine) injection 6,900 Units, 80 Units/kg, IntraVENous, PRN  heparin (porcine) injection 3,450 Units, 40 Units/kg, IntraVENous, PRN  heparin 25,000 units in dextrose 5% 250 mL (premix) infusion, 5-32 Units/kg/hr, IntraVENous, Continuous        PLAN    Mr. Howell is post op day # 1 L knee ID    Tolerated procedure well  ID consult  AM labs stable, trend hgb hct  WBAT LLE  Aggressive ice to knee, ankle  Monitor and record drain output every shift, likely pull  tomorrow  PT/OT  Resume heparin drip  Dispo- pending clinical course

## 2024-04-17 NOTE — ANESTHESIA POSTPROCEDURE EVALUATION
Department of Anesthesiology  Postprocedure Note    Patient: Fran Howell  MRN: [id number]  YOB: 1965  Date of evaluation: 4/16/2024    Procedure Summary       Date: 04/16/24 Room / Location: [facility] OR 06 / [facility] OR    Anesthesia Start: 1946 Anesthesia Stop: 2052    Procedure: Left Knee Incision and Drainage (Left: Knee) Diagnosis:       Septic arthritis, due to unspecified organism, septic arthritis of unspecified location (HCC)      (Septic arthritis, due to unspecified organism, septic arthritis of unspecified location (HCC) [M00.9])    Surgeons: Frederick Chadwick DO Responsible Provider: Paresh Benavides MD    Anesthesia Type: general ASA Status: 2            Anesthesia Type: No value filed.    Madeline Phase I: Madeline Score: 8    Madeline Phase II:      Anesthesia Post Evaluation    Patient location during evaluation: PACU  Patient participation: complete - patient participated  Level of consciousness: awake and alert  Airway patency: patent  Nausea & Vomiting: no nausea  Cardiovascular status: blood pressure returned to baseline and hemodynamically stable  Respiratory status: acceptable and spontaneous ventilation  Hydration status: euvolemic  Pain management: adequate    No notable events documented.

## 2024-04-17 NOTE — PROGRESS NOTES
Called pharmacy to see what rate the patient's heparin drip should be started. Received instructions to start drip at previous rate 32 units/kg/hr and have a stat anti Xa drawn

## 2024-04-17 NOTE — PLAN OF CARE
Problem: Discharge Planning  Goal: Discharge to home or other facility with appropriate resources  Outcome: Progressing  Flowsheets (Taken 4/17/2024 1733)  Discharge to home or other facility with appropriate resources:   Identify barriers to discharge with patient and caregiver   Arrange for needed discharge resources and transportation as appropriate     Problem: Pain  Goal: Verbalizes/displays adequate comfort level or baseline comfort level  Outcome: Progressing  Flowsheets (Taken 4/17/2024 1733)  Verbalizes/displays adequate comfort level or baseline comfort level:   Encourage patient to monitor pain and request assistance   Assess pain using appropriate pain scale   Administer analgesics based on type and severity of pain and evaluate response   Implement non-pharmacological measures as appropriate and evaluate response     Problem: Skin/Tissue Integrity  Goal: Absence of new skin breakdown  Description: 1.  Monitor for areas of redness and/or skin breakdown  2.  Assess vascular access sites hourly  3.  Every 4-6 hours minimum:  Change oxygen saturation probe site  4.  Every 4-6 hours:  If on nasal continuous positive airway pressure, respiratory therapy assess nares and determine need for appliance change or resting period.  Outcome: Progressing  Note: No new skin breakdown noted at this time this shift.     Problem: Safety - Adult  Goal: Free from fall injury  Outcome: Progressing  Flowsheets (Taken 4/17/2024 1733)  Free From Fall Injury:   Instruct family/caregiver on patient safety   Based on caregiver fall risk screen, instruct family/caregiver to ask for assistance with transferring infant if caregiver noted to have fall risk factors     Problem: Musculoskeletal - Adult  Goal: Return mobility to safest level of function  Outcome: Progressing  Flowsheets (Taken 4/17/2024 1733)  Return Mobility to Safest Level of Function:   Assess patient stability and activity tolerance for standing, transferring and

## 2024-04-18 ENCOUNTER — ANESTHESIA EVENT (OUTPATIENT)
Dept: OPERATING ROOM | Age: 59
End: 2024-04-18
Payer: COMMERCIAL

## 2024-04-18 ENCOUNTER — ANESTHESIA (OUTPATIENT)
Dept: OPERATING ROOM | Age: 59
End: 2024-04-18
Payer: COMMERCIAL

## 2024-04-18 LAB
ANION GAP SERPL CALC-SCNC: 12 MEQ/L (ref 8–16)
BACTERIA BLD AEROBE CULT: NORMAL
BACTERIA BLD AEROBE CULT: NORMAL
BUN SERPL-MCNC: 19 MG/DL (ref 7–22)
CALCIUM SERPL-MCNC: 8.4 MG/DL (ref 8.5–10.5)
CHLORIDE SERPL-SCNC: 101 MEQ/L (ref 98–111)
CO2 SERPL-SCNC: 26 MEQ/L (ref 23–33)
CREAT SERPL-MCNC: 0.8 MG/DL (ref 0.4–1.2)
CRP SERPL-MCNC: 11.15 MG/DL (ref 0–1)
DEPRECATED RDW RBC AUTO: 49.8 FL (ref 35–45)
ERYTHROCYTE [DISTWIDTH] IN BLOOD BY AUTOMATED COUNT: 13.7 % (ref 11.5–14.5)
GFR SERPL CREATININE-BSD FRML MDRD: > 90 ML/MIN/1.73M2
GLUCOSE SERPL-MCNC: 106 MG/DL (ref 70–108)
HCT VFR BLD AUTO: 35.6 % (ref 42–52)
HEPARIN UNFRACTIONATED: 0.4 U/ML (ref 0.3–0.7)
HEPARIN UNFRACTIONATED: 0.54 U/ML (ref 0.3–0.7)
HEPARIN UNFRACTIONATED: 0.61 U/ML (ref 0.3–0.7)
HEPARIN UNFRACTIONATED: < 0.04 U/ML (ref 0.3–0.7)
HGB BLD-MCNC: 11.8 GM/DL (ref 14–18)
MCH RBC QN AUTO: 32.8 PG (ref 26–33)
MCHC RBC AUTO-ENTMCNC: 33.1 GM/DL (ref 32.2–35.5)
MCV RBC AUTO: 98.9 FL (ref 80–94)
PLATELET # BLD AUTO: 566 THOU/MM3 (ref 130–400)
PMV BLD AUTO: 9.5 FL (ref 9.4–12.4)
POTASSIUM SERPL-SCNC: 4.8 MEQ/L (ref 3.5–5.2)
RBC # BLD AUTO: 3.6 MILL/MM3 (ref 4.7–6.1)
SODIUM SERPL-SCNC: 139 MEQ/L (ref 135–145)
WBC # BLD AUTO: 21.9 THOU/MM3 (ref 4.8–10.8)

## 2024-04-18 PROCEDURE — 97530 THERAPEUTIC ACTIVITIES: CPT

## 2024-04-18 PROCEDURE — 7100000000 HC PACU RECOVERY - FIRST 15 MIN: Performed by: ORTHOPAEDIC SURGERY

## 2024-04-18 PROCEDURE — 87102 FUNGUS ISOLATION CULTURE: CPT

## 2024-04-18 PROCEDURE — 6370000000 HC RX 637 (ALT 250 FOR IP): Performed by: PHYSICIAN ASSISTANT

## 2024-04-18 PROCEDURE — 3600000002 HC SURGERY LEVEL 2 BASE: Performed by: ORTHOPAEDIC SURGERY

## 2024-04-18 PROCEDURE — 87070 CULTURE OTHR SPECIMN AEROBIC: CPT

## 2024-04-18 PROCEDURE — 2580000003 HC RX 258: Performed by: REGISTERED NURSE

## 2024-04-18 PROCEDURE — 3700000001 HC ADD 15 MINUTES (ANESTHESIA): Performed by: ORTHOPAEDIC SURGERY

## 2024-04-18 PROCEDURE — 7100000001 HC PACU RECOVERY - ADDTL 15 MIN: Performed by: ORTHOPAEDIC SURGERY

## 2024-04-18 PROCEDURE — 6360000002 HC RX W HCPCS: Performed by: PHYSICIAN ASSISTANT

## 2024-04-18 PROCEDURE — 97162 PT EVAL MOD COMPLEX 30 MIN: CPT

## 2024-04-18 PROCEDURE — 36415 COLL VENOUS BLD VENIPUNCTURE: CPT

## 2024-04-18 PROCEDURE — 3700000000 HC ANESTHESIA ATTENDED CARE: Performed by: ORTHOPAEDIC SURGERY

## 2024-04-18 PROCEDURE — 85520 HEPARIN ASSAY: CPT

## 2024-04-18 PROCEDURE — 87205 SMEAR GRAM STAIN: CPT

## 2024-04-18 PROCEDURE — 97116 GAIT TRAINING THERAPY: CPT

## 2024-04-18 PROCEDURE — 1200000000 HC SEMI PRIVATE

## 2024-04-18 PROCEDURE — 97166 OT EVAL MOD COMPLEX 45 MIN: CPT

## 2024-04-18 PROCEDURE — 6360000002 HC RX W HCPCS: Performed by: ANESTHESIOLOGY

## 2024-04-18 PROCEDURE — 2580000003 HC RX 258: Performed by: PHYSICIAN ASSISTANT

## 2024-04-18 PROCEDURE — 80048 BASIC METABOLIC PNL TOTAL CA: CPT

## 2024-04-18 PROCEDURE — 2709999900 HC NON-CHARGEABLE SUPPLY: Performed by: ORTHOPAEDIC SURGERY

## 2024-04-18 PROCEDURE — 99232 SBSQ HOSP IP/OBS MODERATE 35: CPT | Performed by: PHYSICIAN ASSISTANT

## 2024-04-18 PROCEDURE — 3600000012 HC SURGERY LEVEL 2 ADDTL 15MIN: Performed by: ORTHOPAEDIC SURGERY

## 2024-04-18 PROCEDURE — 87075 CULTR BACTERIA EXCEPT BLOOD: CPT

## 2024-04-18 PROCEDURE — 6360000002 HC RX W HCPCS: Performed by: REGISTERED NURSE

## 2024-04-18 PROCEDURE — 85027 COMPLETE CBC AUTOMATED: CPT

## 2024-04-18 PROCEDURE — 86140 C-REACTIVE PROTEIN: CPT

## 2024-04-18 PROCEDURE — 6360000002 HC RX W HCPCS: Performed by: INTERNAL MEDICINE

## 2024-04-18 PROCEDURE — 0SBD0ZZ EXCISION OF LEFT KNEE JOINT, OPEN APPROACH: ICD-10-PCS | Performed by: ORTHOPAEDIC SURGERY

## 2024-04-18 PROCEDURE — 2580000003 HC RX 258: Performed by: INTERNAL MEDICINE

## 2024-04-18 PROCEDURE — 97535 SELF CARE MNGMENT TRAINING: CPT

## 2024-04-18 RX ORDER — MIDAZOLAM HYDROCHLORIDE 1 MG/ML
INJECTION INTRAMUSCULAR; INTRAVENOUS PRN
Status: DISCONTINUED | OUTPATIENT
Start: 2024-04-18 | End: 2024-04-18 | Stop reason: SDUPTHER

## 2024-04-18 RX ORDER — HYDROMORPHONE HYDROCHLORIDE 2 MG/ML
INJECTION, SOLUTION INTRAMUSCULAR; INTRAVENOUS; SUBCUTANEOUS PRN
Status: DISCONTINUED | OUTPATIENT
Start: 2024-04-18 | End: 2024-04-18 | Stop reason: SDUPTHER

## 2024-04-18 RX ORDER — ONDANSETRON 2 MG/ML
INJECTION INTRAMUSCULAR; INTRAVENOUS PRN
Status: DISCONTINUED | OUTPATIENT
Start: 2024-04-18 | End: 2024-04-18 | Stop reason: SDUPTHER

## 2024-04-18 RX ORDER — CEFAZOLIN SODIUM 1 G/3ML
INJECTION, POWDER, FOR SOLUTION INTRAMUSCULAR; INTRAVENOUS PRN
Status: DISCONTINUED | OUTPATIENT
Start: 2024-04-18 | End: 2024-04-18 | Stop reason: SDUPTHER

## 2024-04-18 RX ORDER — ONDANSETRON 2 MG/ML
4 INJECTION INTRAMUSCULAR; INTRAVENOUS
Status: DISCONTINUED | OUTPATIENT
Start: 2024-04-18 | End: 2024-04-18 | Stop reason: HOSPADM

## 2024-04-18 RX ORDER — SODIUM CHLORIDE 9 MG/ML
INJECTION, SOLUTION INTRAVENOUS CONTINUOUS PRN
Status: DISCONTINUED | OUTPATIENT
Start: 2024-04-18 | End: 2024-04-18 | Stop reason: SDUPTHER

## 2024-04-18 RX ORDER — PROPOFOL 10 MG/ML
INJECTION, EMULSION INTRAVENOUS PRN
Status: DISCONTINUED | OUTPATIENT
Start: 2024-04-18 | End: 2024-04-18 | Stop reason: SDUPTHER

## 2024-04-18 RX ORDER — SODIUM CHLORIDE 0.9 % (FLUSH) 0.9 %
5-40 SYRINGE (ML) INJECTION EVERY 12 HOURS SCHEDULED
Status: DISCONTINUED | OUTPATIENT
Start: 2024-04-18 | End: 2024-04-18 | Stop reason: HOSPADM

## 2024-04-18 RX ORDER — DEXAMETHASONE SODIUM PHOSPHATE 10 MG/ML
INJECTION, EMULSION INTRAMUSCULAR; INTRAVENOUS PRN
Status: DISCONTINUED | OUTPATIENT
Start: 2024-04-18 | End: 2024-04-18 | Stop reason: SDUPTHER

## 2024-04-18 RX ORDER — FENTANYL CITRATE 50 UG/ML
50 INJECTION, SOLUTION INTRAMUSCULAR; INTRAVENOUS EVERY 5 MIN PRN
Status: COMPLETED | OUTPATIENT
Start: 2024-04-18 | End: 2024-04-18

## 2024-04-18 RX ORDER — MEPERIDINE HYDROCHLORIDE 25 MG/ML
12.5 INJECTION INTRAMUSCULAR; INTRAVENOUS; SUBCUTANEOUS EVERY 5 MIN PRN
Status: DISCONTINUED | OUTPATIENT
Start: 2024-04-18 | End: 2024-04-18 | Stop reason: HOSPADM

## 2024-04-18 RX ORDER — SODIUM CHLORIDE 0.9 % (FLUSH) 0.9 %
5-40 SYRINGE (ML) INJECTION PRN
Status: DISCONTINUED | OUTPATIENT
Start: 2024-04-18 | End: 2024-04-18 | Stop reason: HOSPADM

## 2024-04-18 RX ORDER — NALOXONE HYDROCHLORIDE 0.4 MG/ML
INJECTION, SOLUTION INTRAMUSCULAR; INTRAVENOUS; SUBCUTANEOUS PRN
Status: DISCONTINUED | OUTPATIENT
Start: 2024-04-18 | End: 2024-04-18 | Stop reason: HOSPADM

## 2024-04-18 RX ORDER — FENTANYL CITRATE 50 UG/ML
INJECTION, SOLUTION INTRAMUSCULAR; INTRAVENOUS PRN
Status: DISCONTINUED | OUTPATIENT
Start: 2024-04-18 | End: 2024-04-18 | Stop reason: SDUPTHER

## 2024-04-18 RX ORDER — LIDOCAINE HCL/PF 100 MG/5ML
SYRINGE (ML) INJECTION PRN
Status: DISCONTINUED | OUTPATIENT
Start: 2024-04-18 | End: 2024-04-18 | Stop reason: SDUPTHER

## 2024-04-18 RX ORDER — SODIUM CHLORIDE 9 MG/ML
INJECTION, SOLUTION INTRAVENOUS PRN
Status: DISCONTINUED | OUTPATIENT
Start: 2024-04-18 | End: 2024-04-18 | Stop reason: HOSPADM

## 2024-04-18 RX ADMIN — HYDROCODONE BITARTRATE AND ACETAMINOPHEN 1 TABLET: 5; 325 TABLET ORAL at 23:32

## 2024-04-18 RX ADMIN — HYDROMORPHONE HYDROCHLORIDE 1 MG: 2 INJECTION INTRAMUSCULAR; INTRAVENOUS; SUBCUTANEOUS at 19:03

## 2024-04-18 RX ADMIN — HYDROCODONE BITARTRATE AND ACETAMINOPHEN 2 TABLET: 5; 325 TABLET ORAL at 08:30

## 2024-04-18 RX ADMIN — POLYETHYLENE GLYCOL 3350 17 G: 17 POWDER, FOR SOLUTION ORAL at 08:33

## 2024-04-18 RX ADMIN — FENTANYL CITRATE 50 MCG: 50 INJECTION, SOLUTION INTRAMUSCULAR; INTRAVENOUS at 18:48

## 2024-04-18 RX ADMIN — WATER 2000 MG: 1 INJECTION INTRAMUSCULAR; INTRAVENOUS; SUBCUTANEOUS at 22:32

## 2024-04-18 RX ADMIN — MIDAZOLAM 2 MG: 1 INJECTION INTRAMUSCULAR; INTRAVENOUS at 18:41

## 2024-04-18 RX ADMIN — Medication 60 MG: at 18:45

## 2024-04-18 RX ADMIN — PROPOFOL 150 MG: 10 INJECTION, EMULSION INTRAVENOUS at 18:45

## 2024-04-18 RX ADMIN — WATER 2000 MG: 1 INJECTION INTRAMUSCULAR; INTRAVENOUS; SUBCUTANEOUS at 12:07

## 2024-04-18 RX ADMIN — CEFAZOLIN 2 G: 1 INJECTION, POWDER, FOR SOLUTION INTRAMUSCULAR; INTRAVENOUS at 18:47

## 2024-04-18 RX ADMIN — ANTACID TABLETS 500 MG: 500 TABLET, CHEWABLE ORAL at 04:14

## 2024-04-18 RX ADMIN — FENTANYL CITRATE 50 MCG: 50 INJECTION, SOLUTION INTRAMUSCULAR; INTRAVENOUS at 18:50

## 2024-04-18 RX ADMIN — DOCUSATE SODIUM 50 MG AND SENNOSIDES 8.6 MG 2 TABLET: 8.6; 5 TABLET, FILM COATED ORAL at 08:29

## 2024-04-18 RX ADMIN — HYDROMORPHONE HYDROCHLORIDE 0.5 MG: 2 INJECTION INTRAMUSCULAR; INTRAVENOUS; SUBCUTANEOUS at 19:09

## 2024-04-18 RX ADMIN — HEPARIN SODIUM 32 UNITS/KG/HR: 10000 INJECTION, SOLUTION INTRAVENOUS at 22:47

## 2024-04-18 RX ADMIN — SODIUM CHLORIDE: 9 INJECTION, SOLUTION INTRAVENOUS at 20:40

## 2024-04-18 RX ADMIN — FENTANYL CITRATE 50 MCG: 50 INJECTION INTRAMUSCULAR; INTRAVENOUS at 19:45

## 2024-04-18 RX ADMIN — HYDROCODONE BITARTRATE AND ACETAMINOPHEN 2 TABLET: 5; 325 TABLET ORAL at 14:43

## 2024-04-18 RX ADMIN — SODIUM CHLORIDE: 9 INJECTION, SOLUTION INTRAVENOUS at 18:41

## 2024-04-18 RX ADMIN — FENTANYL CITRATE 50 MCG: 50 INJECTION INTRAMUSCULAR; INTRAVENOUS at 19:50

## 2024-04-18 RX ADMIN — ONDANSETRON 4 MG: 2 INJECTION INTRAMUSCULAR; INTRAVENOUS at 18:47

## 2024-04-18 RX ADMIN — DEXAMETHASONE SODIUM PHOSPHATE 8 MG: 10 INJECTION, EMULSION INTRAMUSCULAR; INTRAVENOUS at 18:47

## 2024-04-18 RX ADMIN — SODIUM CHLORIDE, PRESERVATIVE FREE 10 ML: 5 INJECTION INTRAVENOUS at 22:48

## 2024-04-18 RX ADMIN — HEPARIN SODIUM 32 UNITS/KG/HR: 10000 INJECTION, SOLUTION INTRAVENOUS at 04:16

## 2024-04-18 ASSESSMENT — PAIN DESCRIPTION - ORIENTATION
ORIENTATION: LEFT

## 2024-04-18 ASSESSMENT — PAIN DESCRIPTION - LOCATION
LOCATION: LEG
LOCATION: KNEE
LOCATION: KNEE
LOCATION: LEG

## 2024-04-18 ASSESSMENT — PAIN - FUNCTIONAL ASSESSMENT
PAIN_FUNCTIONAL_ASSESSMENT: ACTIVITIES ARE NOT PREVENTED
PAIN_FUNCTIONAL_ASSESSMENT: WONG-BAKER FACES

## 2024-04-18 ASSESSMENT — PAIN DESCRIPTION - ONSET
ONSET: ON-GOING

## 2024-04-18 ASSESSMENT — PAIN DESCRIPTION - DESCRIPTORS
DESCRIPTORS: ACHING
DESCRIPTORS: ACHING
DESCRIPTORS: SORE
DESCRIPTORS: SHARP
DESCRIPTORS: ACHING
DESCRIPTORS: ACHING

## 2024-04-18 ASSESSMENT — PAIN SCALES - WONG BAKER
WONGBAKER_NUMERICALRESPONSE: NO HURT

## 2024-04-18 ASSESSMENT — PAIN SCALES - GENERAL
PAINLEVEL_OUTOF10: 9
PAINLEVEL_OUTOF10: 6
PAINLEVEL_OUTOF10: 7
PAINLEVEL_OUTOF10: 7
PAINLEVEL_OUTOF10: 3
PAINLEVEL_OUTOF10: 5
PAINLEVEL_OUTOF10: 4
PAINLEVEL_OUTOF10: 4
PAINLEVEL_OUTOF10: 7

## 2024-04-18 ASSESSMENT — PAIN DESCRIPTION - FREQUENCY
FREQUENCY: INTERMITTENT

## 2024-04-18 ASSESSMENT — PAIN DESCRIPTION - PAIN TYPE
TYPE: ACUTE PAIN;SURGICAL PAIN
TYPE: ACUTE PAIN;SURGICAL PAIN
TYPE: SURGICAL PAIN;ACUTE PAIN

## 2024-04-18 NOTE — OP NOTE
[address] [address]                            OPERATIVE REPORT      PATIENT NAME: MEHDI JETT               : 1965  MED REC NO: [id number]                       ROOM: 7K-10  ACCOUNT NO: [id number]                       ADMIT DATE: 2024  PROVIDER: Frederick Chadwick DO      DATE OF PROCEDURE:  2024    SURGEON:  Frederick Chadwick DO    PREOPERATIVE DIAGNOSIS:  Left knee septic arthritis.    POSTOPERATIVE DIAGNOSIS:  Left knee septic arthritis.    OPERATION PERFORMED:  Left knee arthrotomy with irrigation of the joint.    ASSISTANT:  Johan Nowak PA-C.  He assisted in positioning, retraction, closure, and dressing application.    TYPE OF ANESTHESIA:  General.    BLOOD LOSS:  50 mL.    IMPLANTS:  None.    SPECIMENS:  Fluid was sent for culture.    INDICATIONS FOR OPERATION:  The patient is a 58-year-old male, who presented to the ER initially with knee and leg pain.  Diagnosed with acute DVT.  He had an injury to the knee a couple of weeks prior.  He was actually being seen as an outpatient for this.  He ended up becoming febrile, and an aspiration of the knee was performed with a cell count confirming an infected joint.  I recommended irrigation of the joint.  Risks, benefits, and alternatives were reviewed.  The patient elected to proceed.    OPERATIVE SUMMARY:  The patient was met in the preoperative holding area, and the correct extremity was identified and marked.  Informed consent was obtained.  The patient was escorted to the operative suite, and general anesthesia was administered.  He was prepped and draped in standard surgical fashion.  A time-out was taken, and the correct patient, procedure, and operative site were agreed upon by all who were present.  I made a midline incision over the superior aspect of the patella.  Bovie dissection was carried down to the quad tendon and over the patella.  I then

## 2024-04-18 NOTE — BRIEF OP NOTE
Brief Postoperative Note      Patient: Fran Howell  YOB: 1965  MRN: [id number]    Date of Procedure: 4/18/2024    Pre-Op Diagnosis Codes:     * Septic arthritis, due to unspecified organism, septic arthritis of unspecified location (HCC) [M00.9]    Post-Op Diagnosis: Same       Procedure(s):  Left Knee I&D    Surgeon(s):  Frederick Chadwick DO    Assistant:  Physician Assistant: Johan Nowak PA-C    Anesthesia: General    Estimated Blood Loss (mL): less than 50     Complications: None    Specimens:   ID Type Source Tests Collected by Time Destination   1 : Left Knee Tissue Joint, Knee CULTURE, FUNGUS, GRAM STAIN, CULTURE, ANAEROBIC AND AEROBIC Frederick Chadwick DO 4/18/2024 1906        Implants:  * No implants in log *      Drains:   Closed/Suction Drain Left Knee Accordion (Active)       [REMOVED] Closed/Suction Drain Left;Anterior Knee Accordion (Removed)   Site Description Clean, dry & intact 04/18/24 1200   Dressing Status Clean, dry & intact 04/18/24 1200   Drainage Appearance Bloody 04/18/24 0815   Drain Status Compressed 04/18/24 1200   Output (ml) 30 ml 04/18/24 0332       Findings:  Infection Present At Time Of Surgery (PATOS) (choose all levels that have infection present):  - Deep Infection (muscle/fascia) present as evidenced by fluid consistent with infection  Other Findings: postop diagnosis confirmed    Electronically signed by Johan Nowak PA-C on 4/18/2024 at 7:37 PM

## 2024-04-18 NOTE — ANESTHESIA PRE PROCEDURE
Department of Anesthesiology  Preprocedure Note       Name:  Fran Howell   Age:  58 y.o.  :  1965                                          MRN:  [id number]         Date:  2024      Surgeon: Surgeon(s):  Frederick Chadwick DO    Procedure: Procedure(s):  Left Knee I&D    Medications prior to admission:   Prior to Admission medications    Medication Sig Start Date End Date Taking? Authorizing Provider   acetaminophen (TYLENOL) 325 MG tablet Take 2 tablets by mouth every 6 hours as needed for Pain   Yes ProviderHenry MD   HYDROcodone-acetaminophen (NORCO) 5-325 MG per tablet Take 1 tablet by mouth every 6 hours as needed for Pain. Max Daily Amount: 4 tablets   Yes Henry Arguello MD   ibuprofen (ADVIL;MOTRIN) 200 MG tablet Take 1 tablet by mouth every 6 hours as needed for Pain   Yes ProviderHenry MD       Current medications:    Current Facility-Administered Medications   Medication Dose Route Frequency Provider Last Rate Last Admin    ceFAZolin (ANCEF) 2,000 mg in sterile water 20 mL IV syringe  2,000 mg IntraVENous Q8H Jerardo Singer MD   2,000 mg at 24 1207    sennosides-docusate sodium (SENOKOT-S) 8.6-50 MG tablet 2 tablet  2 tablet Oral Daily Susanne Lau PA-C   2 tablet at 24 0829    calcium carbonate (TUMS) chewable tablet 500 mg  500 mg Oral TID PRN Johan Nowak PA-C   500 mg at 24 0414    sodium chloride flush 0.9 % injection 5-40 mL  5-40 mL IntraVENous 2 times per day Johan Nowak PA-C   10 mL at 24    sodium chloride flush 0.9 % injection 5-40 mL  5-40 mL IntraVENous PRN Johan Nowak PA-C        0.9 % sodium chloride infusion   IntraVENous PRN Johan Nowak PA-C        potassium chloride (KLOR-CON M) extended release tablet 40 mEq  40 mEq Oral PRN Johan Nowak PA-C        Or    potassium bicarb-citric acid (EFFER-K) effervescent tablet 40 mEq  40 mEq Oral PRN Johan Nowak PA-C        Or    potassium

## 2024-04-18 NOTE — PROGRESS NOTES
[facility]  INPATIENT PHYSICAL THERAPY  EVALUATION  [facility] ORTHOPEDICS 7K - 7K-10/010-A    Time In: 0847  Time Out: 0918  Timed Code Treatment Minutes: 23 Minutes  Minutes: 31          Date: 2024  Patient Name: Fran Howell,  Gender:  male        MRN: [id number]  : 1965  (58 y.o.)      Referring Practitioner: Dilma Jaime PA-C  Diagnosis: left leg pain  Additional Pertinent Hx: Per EMR \"Fran Howell is a 58 y.o. male with no PMHx who presents to [facility] with left leg pain.  Patient states that he injured his leg last Thursday and he wrapped his leg, but today the pain has become unbearable. He is not able to ambulate well at home. He also developed new nausea and vomiting over the last couple of days.      Noted by ED staff that patient's left leg was wrapped with coban as the patient did have a brace for his left leg, but his leg was so swollen he couldn't fit it on the leg. When they assessed the left leg it was noted to be mottled and his toes were blue.     Denies fevers, chills, rigors, chest pain, and shortness of breath  \" Pt is s/p Left Knee Incision and Drainage completed by Dr Chadwick on .     Restrictions/Precautions:  Restrictions/Precautions: General Precautions, Fall Risk, Weight Bearing  Left Lower Extremity Weight Bearing: Weight Bearing As Tolerated  Position Activity Restriction  Other position/activity restrictions: drain    Subjective:  Chart Reviewed: Yes  Patient assessed for rehabilitation services?: Yes  Family / Caregiver Present: No  Subjective: OK to see pt per nursing. Pt in bed when PT arrived, pleasant and agreeable to PT session. Pt willng to complete mobility, requesting to use restroom and then sit in chair.    General:  Overall Orientation Status: Within Normal Limits  Orientation Level: Oriented X4  Vision: Within Functional Limits  Hearing: Within functional limits       Pain: 8/10    Vitals: Vitals not assessed per

## 2024-04-18 NOTE — PROGRESS NOTES
Hospitalist Progress Note    Patient:  Fran Howell    YOB: 1965  Unit/Bed:7K-10/010-A  Date of Admission: 4/12/2024  Code Status: Full Code      Assessment/Plan:    L knee septic arthritis:   Received Vanc 4/13-4/14, ceftriaxone 4/13-4/16.  Joint aspirated 4/16, high PMN and TNC count.   S/p I&D 4/16. Drain in place.  Culture negative; however, given appearance of fluid, plan to treat as septic arthritis. Pt did receive ABX prior to culture being taken.   Continue Ancef (start date 4/18).  Ortho & ID following.    Discussed with Ortho - pus draining from Hemovac. Plan for I&D again tonight, 4/18.     Acute LLE DVT: Doppler US with acute DVT of single peroneal vein in the left calf. Pt placed on heparin gtt on arrival, plan for DOAC at dc for 3-6 months.   Will transition to DOAC when clear from Orthopedic standpoint.     Leukocytosis: secondary to #1. Abx as above. Daily CBC      Thrombocytosis: likely reactive. Daily CBC.     Nausea/vomiting (POA), resolved     Constipation: Add Senna-S, Glycolax       LDA: []CVC / []PICC / []Midline / []Camacho / []Drains / []Mediport / [x]None  Antibiotics: ceftriaxone  Labs (still needed?): [x]Yes / []No  IVF (still needed?): []Yes / [x]No    Level of care: [x]Step Down / []Med-Surg  Bed Status: [x]Inpatient / []Observation  Telemetry: []Yes / [x]No  PT/OT: [x]Yes / []No    DVT Prophylaxis: [] Lovenox / [x] Heparin / [] SCDs / [] Already on Systemic Anticoagulation / [] None     Chief Complaint: left leg pain     HPI / Hospital Course: 58 y.o. male admitted to the hospitalist service for left leg pain. Pt reported injury to left leg on 4/11. Pt had been using a brace, but developed swelling and could no longer fit in his brace. He had wrapped his LLE with coban, noticed discoloration of toes after.   Doppler US with acute DVT of single peroneal vein in the left calf. Left knee XR 4/12 showed moderate joint knee effusion. Pt received IV Vanc 4/13-4/14,  \"BILITOT\", \"ALKPHOS\" in the last 72 hours.  Recent Labs     04/16/24  0845 04/17/24  0051   INR 1.27* 1.26*       No results for input(s): \"CKTOTAL\", \"TROPONINI\" in the last 72 hours.  Urinalysis:   No results found for: \"NITRU\", \"WBCUA\", \"BACTERIA\", \"RBCUA\", \"BLOODU\", \"SPECGRAV\", \"GLUCOSEU\"  Urine culture: No results found for: \"LABURIN\"  Micro:   Blood culture #1:   Lab Results   Component Value Date/Time    BC  04/12/2024 11:03 PM     No growth 24 hours. No growth 48 hours. No growth at 5 days     Blood culture #2:No results found for: \"BLOODCULT2\"  Organism:No results found for: \"ORG\"      Lab Results   Component Value Date/Time    LABGRAM  04/16/2024 08:13 PM     Many segmented neutrophils observed. No bacteria seen.     MRSA culture only:No results found for: \"MRSAC\"  Respiratory culture: No results found for: \"CULTRESP\"  Aerobic and Anaerobic :  Lab Results   Component Value Date/Time    LABAERO No growth-preliminary No growth 04/16/2024 08:13 PM     Lab Results   Component Value Date/Time    LABANAE No growth-preliminary 04/16/2024 08:13 PM       Radiology Reports:  Vascular duplex lower extremity venous left   Final Result   1. Acute deep venous thrombosis limited to a single peroneal vein in the left calf.   2. Moderate size suprapatellar seroma or joint effusion.            **This report has been created using voice recognition software.  It may contain minor errors which are inherent in voice recognition technology.**      Final report electronically signed by Dr. Aman Jarrett on 4/15/2024 8:45 AM      XR TIBIA FIBULA LEFT (2 VIEWS)   Final Result   Impression:   1. No acute osseous abnormality.   2. Knee joint effusion.   3. Diffuse subcutaneous edema.      This document has been electronically signed by: Christian Olivas MD on    04/12/2024 11:12 PM      XR KNEE LEFT (MIN 4 VIEWS)   Final Result   Impression:   1. No acute osseous abnormality.   2. Moderate knee joint effusion.   3. Subcutaneous edema.

## 2024-04-18 NOTE — CARE COORDINATION
4/18/24, 2:19 PM EDT    DISCHARGE ON GOING EVALUATION    [facility] day: 3  Location: 7K-10/010-A Reason for admit: Left leg pain [M79.605]  Cellulitis of left lower extremity [L03.116]  Acute deep vein thrombosis (DVT) of proximal vein of left lower extremity (HCC) [I82.4Y2]  Acute deep vein thrombosis (DVT) of calf muscle vein of left lower extremity (HCC) [I82.462]     Procedures:   4/16 70cc cloudy fluid was aspirated from the superolateral portal of the left knee at Bedside        Imaging since last note:    Doppler US with acute DVT of single peroneal vein in the left calf.      Barriers to Discharge: conts on Heparin gtt, planning to change to Eliquis per hospitalist, conts on Ancef IV every 8 hrs. Norco, Miralax. Senokot. No BM. Ortho and ID follows.    PCP: Phi Cueto MD  Readmission Risk Score: 11%    Patient Goals/Plan/Treatment Preferences: spoke with Fran and his wife earlier today; he has crutches, walker, and reacher at home. His sister Jolie is assisting him with getting a hospital bed thru [employer]. Declined other needs at discharge.           Lives With: Spouse, Son  Type of Home: House  Home Layout: Two level, Able to Live on Main level with bedroom/bathroom, Performs ADL's on one level  Home Access: Stairs to enter without rails  Entrance Stairs - Number of Steps: 5 BRICE  Home Equipment: Hospital bed, Rollator

## 2024-04-18 NOTE — PROGRESS NOTES
1940 pt arrived to pacu, awakens to voice. Respirations unlabored on room. Site CDI with 1 hemovac in place. VSS. Pt denies pain at this time  1945 pt awake in bed, states pain 7/10. Medicated with 50 mcg fentanyl. VSS  1950 no change in pain status, medicated with 50 mcg fentanyl  1955 pt states pain 4/10 and tolerable. VSS  2010 pt meets criteria for discharge from pacu at this time. Pt transported to [facility] in stable condition

## 2024-04-18 NOTE — PROGRESS NOTES
Orthopaedic Progress Note      SUBJECTIVE   Mr. Howell is post op day # 2 L knee ID    Seen at bedside this morning  no adverse events overnight  Pain well controlled, tolerating oral diet  Denies any numbness/paresthesia in operative extremity  Mobilized well  Some pain but patient feels this is incisional  No nausea vomiting  CRP 11    OBJECTIVE      Physical    VITALS:  /63   Pulse 88   Temp 98.2 °F (36.8 °C) (Oral)   Resp 18   Ht 1.727 m (5' 8\")   Wt 86.2 kg (190 lb)   SpO2 94%   BMI 28.89 kg/m²   I/O last 3 completed shifts:  In: 1480 [P.O.:680; I.V.:800]  Out: 24557 [Urine:69600; Drains:110]    6/10 pain  Gen: alert and oriented  Head: normorcephalic, atraumatic  Resp: unlabored, room air  Pelvis: stable  LLE: incision c/d/I, no drainage, no bandage saturation  Drain x 1 intact, 110 output, purulence concern in tubing today  Sensation to light touch intact  Gastroc TA EHL intact  Distal pulses palpable, warm well perfused  Calf supple nontender to palpation       Data  CBC:   Lab Results   Component Value Date/Time    WBC 21.9 04/18/2024 06:32 AM    HGB 11.8 04/18/2024 06:32 AM     04/18/2024 06:32 AM     BMP:    Lab Results   Component Value Date/Time     04/18/2024 06:32 AM    K 4.8 04/18/2024 06:32 AM    K 4.6 04/15/2024 05:31 AM     04/18/2024 06:32 AM    CO2 26 04/18/2024 06:32 AM    BUN 19 04/18/2024 06:32 AM    CREATININE 0.8 04/18/2024 06:32 AM    CALCIUM 8.4 04/18/2024 06:32 AM    GLUCOSE 106 04/18/2024 06:32 AM     Uric Acid:  No components found for: \"URIC\"  PT/INR:    Lab Results   Component Value Date/Time    INR 1.26 04/17/2024 12:51 AM     Troponin:  No results found for: \"TROPONINI\"  Urine Culture:  No components found for: \"CURINE\"      Current Inpatient Medications    Current Facility-Administered Medications: ceFAZolin (ANCEF) 2,000 mg in sterile water 20 mL IV syringe, 2,000 mg, IntraVENous, Q8H  sennosides-docusate sodium (SENOKOT-S) 8.6-50 MG tablet 2

## 2024-04-18 NOTE — ADT AUTH CERT
Temp 98.2 °F (36.8 °C) (Oral)   Resp 18   Ht 1.727 m (5' 8\")   Wt 86.2 kg (190 lb)   SpO2 94%   BMI 28.89 kg/m²   General:   Pleasant male. NAD.   HEENT:  normocephalic and atraumatic.  No scleral icterus. PERR.  Neck: supple.  No JVD. No thyromegaly.  Lungs: clear to auscultation.  No retractions  Cardiac: RRR without murmur.  Abdomen: soft.  Nontender.  Bowel sounds positive.  Extremities:  No clubbing, cyanosis, or edema x 4.  LLE ACE wrapped. Drain in place.   Vasculature: capillary refill < 3 seconds. Palpable LE pulses bilaterally.  Skin:  warm and dry.  Psych:  Alert and oriented x3.  Affect appropriate  Lymph:  No supraclavicular adenopathy.  Neurologic:  No focal deficit.  No seizures.        Data: (All radiographs, tracings, PFTs, and imaging are personally viewed and interpreted unless otherwise noted)  Labs:     Recent Labs    04/17/24  1443 04/17/24  2114 04/18/24  0632  WBC 22.6* 26.3* 21.9*  HGB 11.7* 11.6* 11.8*  HCT 34.9* 34.7* 35.6*  * 485* 566*          Recent Labs    04/18/24  0632    K 4.8    CO2 26  BUN 19  CREATININE 0.8  CALCIUM 8.4*        No results for input(s): \"AST\", \"ALT\", \"BILIDIR\", \"BILITOT\", \"ALKPHOS\" in the last 72 hours.    Recent Labs    04/16/24  0845 04/17/24  0051  INR 1.27* 1.26*        No results for input(s): \"CKTOTAL\", \"TROPONINI\" in the last 72 hours.  Urinalysis:   No results found for: \"NITRU\", \"WBCUA\", \"BACTERIA\", \"RBCUA\", \"BLOODU\", \"SPECGRAV\", \"GLUCOSEU\"  Urine culture: No results found for: \"LABURIN\"  Micro:   Blood culture #1:     Lab Results  Component Value Date/Time    BC   04/12/2024 11:03 PM      No growth 24 hours. No growth 48 hours. No growth at 5 days     Blood culture #2:No results found for: \"BLOODCULT2\"  Organism:No results found for: \"ORG\"        Lab Results  Component Value Date/Time    LABGRAM   04/16/2024 08:13 PM      Many segmented neutrophils observed. No bacteria seen.     MRSA culture only:No results found for:  0507(r) 1,000 mg 100 mL/hr IntraVENous   30 Minutes  Rosemarie Emanuel  81 Stopped 04/14/24 0533(s)  04/14/24 0545(a)  04/14/24 0645(r) 0 mg 0 mL/hr IntraVENous   30 Minutes  Rosemarie Emanuel    Due (Stopped) 04/14/24 0533(s)  04/14/24 0507(r)     IntraVENous      PasRosemarie perdomo  82 New Bag 04/15/24 0630(s)  04/15/24 0511(a)  04/15/24 0515(r) 1,000 mg 100 mL/hr IntraVENous   30 Minutes  PasBeatrice perdomoanda  83 Stopped 04/15/24 0541(s)  04/15/24 0606(a)  04/15/24 0606(r) 0 mg 0 mL/hr IntraVENous   30 Minutes  Rosemarie Emanuel    Due (Stopped) 04/15/24 0541(s)  04/15/24 0515(r)     IntraVENous      Rosemarie Emanuel  84 New Bag 04/16/24 0630(s)  04/16/24 0637(a)  04/16/24 0637(r) 1,000 mg 100 mL/hr IntraVENous   30 Minutes  Sridhar Cadet  85 Stopped 04/16/24 0707(s)  04/16/24 0800(a)  04/16/24 0800(r) 0 mg 0 mL/hr IntraVENous   30 Minutes  Dariana Saucedo    Due (Stopped) 04/16/24 0707(s)  04/16/24 0637(r)     IntraVENous      Sridhar Cadet    (s)=scheduled time  (a)=action time  (r)=recorded time         Medication Administration Report  for Fran Howell as of 04/10/24 through 04/12/24  Legend:     Medications 04/10/24 04/11/24 04/12/24  heparin (porcine) injection 3,450 Units  Dose: 40 Units/kg  Weight Dosing Info: 86.2 kg  Freq: PRN Route: IV  PRN Reason: Other  PRN Comment: heparin dosing algorithm  Start: 04/12/24 2503  Admin Instructions:  Half dose re-bolus based on pharmacy algorithm       heparin (porcine) injection 6,900 Units  Dose: 80 Units/kg  Weight Dosing Info: 86.2 kg  Freq: PRN Route: IV  PRN Reason: Other  PRN Comment: heparin dosing algorithm  Start: 04/12/24 2332  Admin Instructions:  Full dose re-bolus based on pharmacy algorithm       Completed Medications  Medications 04/10/24 04/11/24 04/12/24  cephALEXin (KEFLEX) capsule 500 mg  Dose: 500 mg  Freq: ONCE Route: PO  Start: 04/12/24 2345 End: 04/13/24 0016  Order specific questions:         morphine (PF) injection 4 mg  Dose: 4 mg  Freq: NOW Route:  2349(s)  04/12/24 2248(r)     IntraVENous      Terrie Oro    (s)=scheduled time  (a)=action time  (r)=recorded time

## 2024-04-18 NOTE — PROGRESS NOTES
Progress note: Infectious diseases    Patient - Fran Howell,  Age - 58 y.o.    - 1965      Room Number - 7K-10/010-A   MRN -  [id number]   [facility] # - [id number]  Date of Admission -  2024  9:37 PM    SUBJECTIVE:   Afebrile overnight. No acute complaints. States still has pain in knee which he describes as 6/10.   OBJECTIVE   VITALS    height is 1.727 m (5' 8\") and weight is 86.2 kg (190 lb). His oral temperature is 98.2 °F (36.8 °C). His blood pressure is 123/63 and his pulse is 88. His respiration is 18 and oxygen saturation is 94%.       Wt Readings from Last 3 Encounters:   24 86.2 kg (190 lb)   22 84.6 kg (186 lb 6.4 oz)   21 85 kg (187 lb 4.8 oz)       I/O (24 Hours)    Intake/Output Summary (Last 24 hours) at 2024 0920  Last data filed at 2024 0640  Gross per 24 hour   Intake 680 ml   Output 33701 ml   Net -20874 ml       General Appearance  Awake, alert, oriented,  not  In acute distress  HEENT - normocephalic, atraumatic, pink conjunctiva,  anicteric sclera  Neck - Supple, no mass  Lungs -  Bilateral good air entry, no rhonchi, no wheeze  Cardiovascular - Heart sounds are normal.  Regular rate and rhythm without murmur, gallop or rub.  Abdomen - soft, not distended, nontender,   Neurologic -AX O x 3.  CN II to XII grossly intact.  Skin - No bruising or bleeding  Extremities -  Dressed left knee.  Hemovac in place.    MEDICATIONS:      sennosides-docusate sodium  2 tablet Oral Daily    sodium chloride flush  5-40 mL IntraVENous 2 times per day      sodium chloride      heparin (PORCINE) Infusion 32 Units/kg/hr (24 0413)     calcium carbonate, sodium chloride flush, sodium chloride, potassium chloride **OR** potassium alternative oral replacement **OR** potassium chloride, magnesium sulfate, ondansetron **OR** ondansetron, polyethylene glycol, acetaminophen **OR**  acetaminophen, morphine **OR** morphine, diphenhydrAMINE, HYDROcodone 5 mg - acetaminophen **OR** HYDROcodone 5 mg - acetaminophen, heparin (porcine), heparin (porcine)      LABS:     CBC:   Recent Labs     04/17/24  1443 04/17/24  2114 04/18/24  0632   WBC 22.6* 26.3* 21.9*   HGB 11.7* 11.6* 11.8*   * 485* 566*     BMP:    Recent Labs     04/18/24  0632      K 4.8      CO2 26   BUN 19   CREATININE 0.8   GLUCOSE 106     Calcium:  Recent Labs     04/18/24  0632   CALCIUM 8.4*      Recent Labs     04/16/24  0845 04/17/24  0051   INR 1.27* 1.26*        CULTURES:   UA: No results for input(s): \"SPECGRAV\", \"PHUR\", \"COLORU\", \"CLARITYU\", \"MUCUS\", \"PROTEINU\", \"BLOODU\", \"RBCUA\", \"WBCUA\", \"BACTERIA\", \"NITRU\", \"GLUCOSEU\", \"BILIRUBINUR\", \"UROBILINOGEN\", \"KETUA\", \"LABCAST\", \"LABCASTTY\", \"AMORPHOS\" in the last 72 hours.    Invalid input(s): \"CRYSTALS\"  Micro:   Lab Results   Component Value Date/Time    BC  04/12/2024 11:03 PM     No growth 24 hours. No growth 48 hours. No growth at 5 days         Problem list of patient:     Patient Active Problem List   Diagnosis Code   • Scalp lesion L98.9   • Left leg pain M79.605   • Acute deep vein thrombosis (DVT) of calf muscle vein of left lower extremity (HCC) I82.462         ASSESSMENT/PLAN   Left knee septic joint s/p I&D: culture pending  Will continue iv ancef pending cx report   Discussed with hospitalist        Elton Tomlinson DO, 4/18/2024 9:20 AM   Patient was seen and examined face-to-face by me  The chart, progress notes, labs and radiographs were reviewed.    Questions and concerns were addressed.  I agree with the progress note.

## 2024-04-18 NOTE — PLAN OF CARE
Problem: Pain  Goal: Verbalizes/displays adequate comfort level or baseline comfort level  4/18/2024 0315 by Twila Aly RN  Outcome: Progressing  Flowsheets (Taken 4/17/2024 2000)  Verbalizes/displays adequate comfort level or baseline comfort level: Encourage patient to monitor pain and request assistance  4/17/2024 1733 by Nona Mccain LPN  Outcome: Progressing  Flowsheets (Taken 4/17/2024 1733)  Verbalizes/displays adequate comfort level or baseline comfort level:   Encourage patient to monitor pain and request assistance   Assess pain using appropriate pain scale   Administer analgesics based on type and severity of pain and evaluate response   Implement non-pharmacological measures as appropriate and evaluate response     Problem: Infection - Adult  Goal: Absence of infection at discharge  4/18/2024 0315 by Twila Aly RN  Outcome: Progressing  4/17/2024 1733 by Nona Mccain LPN  Outcome: Progressing  Flowsheets (Taken 4/17/2024 1733)  Absence of infection at discharge:   Assess and monitor for signs and symptoms of infection   Monitor lab/diagnostic results   Monitor all insertion sites i.e., indwelling lines, tubes and drains     Problem: Skin/Tissue Integrity  Goal: Absence of new skin breakdown  Description: 1.  Monitor for areas of redness and/or skin breakdown  2.  Assess vascular access sites hourly  3.  Every 4-6 hours minimum:  Change oxygen saturation probe site  4.  Every 4-6 hours:  If on nasal continuous positive airway pressure, respiratory therapy assess nares and determine need for appliance change or resting period.  4/18/2024 0315 by Twila Aly RN  Outcome: Progressing  4/17/2024 1733 by Nona Mccain LPN  Outcome: Progressing  Note: No new skin breakdown noted at this time this shift.

## 2024-04-18 NOTE — PROGRESS NOTES
[facility]  INPATIENT OCCUPATIONAL THERAPY  [facility]  EVALUATION    Time:   Time In: 1035  Time Out: 1122  Timed Code Treatment Minutes: 38 Minutes  Minutes: 47          Date: 2024  Patient Name: Fran Howell,   Gender: male      MRN: [id number]  : 1965  (58 y.o.)  Referring Practitioner: Dilma Jaime PA-C  Diagnosis: left leg pain  Additional Pertinent Hx: The patient is a 58 y.o. male who presented to the [facility] emergency department on 2024 secondary to increasing left knee pain.  Patient describes an injury at work, this was on 2024.  He describes this as a twisting and hyperextension type mechanism.  He did go to an [facility] hospital following this injury where x-rays at that time showed an acute effusion of the knee.  He was evaluated in our office by Dr. Jcakson and has been set up for an MRI for better evaluation as an outpatient.  Again presented on 2024 secondary to increasing pain which was really involving the entire left lower extremity.  He was provided a hinged knee brace but secondary to increased swelling this brace no longer fit and he took it upon himself to wrap the lower extremity and Coban from the toes to the thigh.  Upon presentation to the emergency department there was some redness to this left lower extremity and his D-dimer was significantly elevated.  X-rays continue to show effusion.  Seen in consultation today.  Patient notes some improvement in his pain to this left lower extremity.  He has been started on empiric antibiotics and was also started on heparin gtt. secondary to left lower extremity DVT.  . Septic arthritis, due to unspecified organism, septic arthritis of unspecified location. Joint aspirated, high PMN and TNC counts.  S/p I&D . Drain in place.    Restrictions/Precautions:  Restrictions/Precautions: General Precautions, Fall Risk, Weight Bearing  Left Lower Extremity Weight  Once a day  Current Treatment Recommendations: Strengthening, Balance training, Functional mobility training, Endurance training, Safety education & training, Neuromuscular re-education, Equipment evaluation, education, & procurement, Self-Care / ADL, Patient/Caregiver education & training.  See long-term goal time frame for expected duration of plan of care.  If no long-term goals established, a short length of stay is anticipated.    Goals:  Patient goals : To return home with wife with less pain  Short Term Goals  Time Frame for Short Term Goals: until discharge  Short Term Goal 1: Pt will navigate  distances and community distances with MI adn good awareness for safety to improve indep with ADL routines.  Short Term Goal 2: Pt will complete UB/LB dressing and bathing with SBA using LHAE as needed to improve indep with ADL routines.  Short Term Goal 3: Pt will tolerate x8 min standing including weightshifting and reaching outside ANN-MARIE to improve balance and activity tolerance required for ADLs and IADLs.         Following session, patient left in safe position with all fall risk precautions in place.

## 2024-04-19 LAB
ANION GAP SERPL CALC-SCNC: 10 MEQ/L (ref 8–16)
BUN SERPL-MCNC: 20 MG/DL (ref 7–22)
CALCIUM SERPL-MCNC: 8 MG/DL (ref 8.5–10.5)
CHLORIDE SERPL-SCNC: 102 MEQ/L (ref 98–111)
CO2 SERPL-SCNC: 25 MEQ/L (ref 23–33)
CREAT SERPL-MCNC: 0.8 MG/DL (ref 0.4–1.2)
DEPRECATED RDW RBC AUTO: 50 FL (ref 35–45)
ERYTHROCYTE [DISTWIDTH] IN BLOOD BY AUTOMATED COUNT: 13.7 % (ref 11.5–14.5)
FUNGUS SPEC FUNGUS STN: NORMAL
GFR SERPL CREATININE-BSD FRML MDRD: > 90 ML/MIN/1.73M2
GLUCOSE SERPL-MCNC: 162 MG/DL (ref 70–108)
HCT VFR BLD AUTO: 34.1 % (ref 42–52)
HEPARIN UNFRACTIONATED: 0.49 U/ML (ref 0.3–0.7)
HEPARIN UNFRACTIONATED: 0.79 U/ML (ref 0.3–0.7)
HGB BLD-MCNC: 11.2 GM/DL (ref 14–18)
MCH RBC QN AUTO: 32.7 PG (ref 26–33)
MCHC RBC AUTO-ENTMCNC: 32.8 GM/DL (ref 32.2–35.5)
MCV RBC AUTO: 99.4 FL (ref 80–94)
PLATELET # BLD AUTO: 552 THOU/MM3 (ref 130–400)
PMV BLD AUTO: 9.1 FL (ref 9.4–12.4)
POTASSIUM SERPL-SCNC: 4.7 MEQ/L (ref 3.5–5.2)
RBC # BLD AUTO: 3.43 MILL/MM3 (ref 4.7–6.1)
SODIUM SERPL-SCNC: 137 MEQ/L (ref 135–145)
WBC # BLD AUTO: 14.9 THOU/MM3 (ref 4.8–10.8)

## 2024-04-19 PROCEDURE — 99232 SBSQ HOSP IP/OBS MODERATE 35: CPT | Performed by: PHYSICIAN ASSISTANT

## 2024-04-19 PROCEDURE — 2580000003 HC RX 258: Performed by: PHYSICIAN ASSISTANT

## 2024-04-19 PROCEDURE — 6370000000 HC RX 637 (ALT 250 FOR IP): Performed by: PHYSICIAN ASSISTANT

## 2024-04-19 PROCEDURE — 6360000002 HC RX W HCPCS: Performed by: PHYSICIAN ASSISTANT

## 2024-04-19 PROCEDURE — 97535 SELF CARE MNGMENT TRAINING: CPT

## 2024-04-19 PROCEDURE — 97116 GAIT TRAINING THERAPY: CPT

## 2024-04-19 PROCEDURE — 1200000000 HC SEMI PRIVATE

## 2024-04-19 PROCEDURE — 85027 COMPLETE CBC AUTOMATED: CPT

## 2024-04-19 PROCEDURE — 97530 THERAPEUTIC ACTIVITIES: CPT

## 2024-04-19 PROCEDURE — 36415 COLL VENOUS BLD VENIPUNCTURE: CPT

## 2024-04-19 PROCEDURE — 85520 HEPARIN ASSAY: CPT

## 2024-04-19 PROCEDURE — 80048 BASIC METABOLIC PNL TOTAL CA: CPT

## 2024-04-19 RX ADMIN — HEPARIN SODIUM 32 UNITS/KG/HR: 10000 INJECTION, SOLUTION INTRAVENOUS at 11:38

## 2024-04-19 RX ADMIN — APIXABAN 10 MG: 5 TABLET, FILM COATED ORAL at 20:48

## 2024-04-19 RX ADMIN — DOCUSATE SODIUM 50 MG AND SENNOSIDES 8.6 MG 2 TABLET: 8.6; 5 TABLET, FILM COATED ORAL at 08:25

## 2024-04-19 RX ADMIN — HEPARIN SODIUM 32 UNITS/KG/HR: 10000 INJECTION, SOLUTION INTRAVENOUS at 08:24

## 2024-04-19 RX ADMIN — HYDROCODONE BITARTRATE AND ACETAMINOPHEN 1 TABLET: 5; 325 TABLET ORAL at 04:20

## 2024-04-19 RX ADMIN — HYDROCODONE BITARTRATE AND ACETAMINOPHEN 2 TABLET: 5; 325 TABLET ORAL at 09:42

## 2024-04-19 RX ADMIN — HYDROCODONE BITARTRATE AND ACETAMINOPHEN 2 TABLET: 5; 325 TABLET ORAL at 18:00

## 2024-04-19 RX ADMIN — WATER 2000 MG: 1 INJECTION INTRAMUSCULAR; INTRAVENOUS; SUBCUTANEOUS at 04:20

## 2024-04-19 RX ADMIN — SODIUM CHLORIDE, PRESERVATIVE FREE 10 ML: 5 INJECTION INTRAVENOUS at 20:43

## 2024-04-19 RX ADMIN — WATER 2000 MG: 1 INJECTION INTRAMUSCULAR; INTRAVENOUS; SUBCUTANEOUS at 20:43

## 2024-04-19 RX ADMIN — WATER 2000 MG: 1 INJECTION INTRAMUSCULAR; INTRAVENOUS; SUBCUTANEOUS at 11:40

## 2024-04-19 ASSESSMENT — PAIN SCALES - GENERAL
PAINLEVEL_OUTOF10: 3
PAINLEVEL_OUTOF10: 0
PAINLEVEL_OUTOF10: 5
PAINLEVEL_OUTOF10: 3
PAINLEVEL_OUTOF10: 7
PAINLEVEL_OUTOF10: 5
PAINLEVEL_OUTOF10: 9
PAINLEVEL_OUTOF10: 2
PAINLEVEL_OUTOF10: 3

## 2024-04-19 ASSESSMENT — PAIN DESCRIPTION - LOCATION
LOCATION: LEG

## 2024-04-19 ASSESSMENT — PAIN - FUNCTIONAL ASSESSMENT
PAIN_FUNCTIONAL_ASSESSMENT: ACTIVITIES ARE NOT PREVENTED

## 2024-04-19 ASSESSMENT — PAIN DESCRIPTION - PAIN TYPE
TYPE: ACUTE PAIN;SURGICAL PAIN

## 2024-04-19 ASSESSMENT — PAIN DESCRIPTION - ORIENTATION
ORIENTATION: LEFT

## 2024-04-19 ASSESSMENT — PAIN DESCRIPTION - DESCRIPTORS
DESCRIPTORS: ACHING

## 2024-04-19 ASSESSMENT — PAIN DESCRIPTION - FREQUENCY
FREQUENCY: INTERMITTENT

## 2024-04-19 ASSESSMENT — PAIN DESCRIPTION - ONSET
ONSET: ON-GOING

## 2024-04-19 NOTE — PROGRESS NOTES
Progress note: Infectious diseases    Patient - Fran Howell,  Age - 58 y.o.    - 1965      Room Number - 7K-10/010-A   MRN -  [id number]   [facility] # - [id number]  Date of Admission -  2024  9:37 PM    SUBJECTIVE:   Afebrile overnight. Some HTN max 167/82 rest vitals WNL. WBC dropped to 14.9 today. No acute complaints. Pt states that prior to surgery yesterday was having leg pain. Then drain fell on the floor and apparently dislodged something because it suddenly started draining a bunch of fluid and pain disappeared. Pt stated after drainage was able to bend leg and walk around. Says feels like pressure is building up in it again today. Drain has bloody drainage. Nursing was just in to try to adjust drain. Denies fvr, chills, night sweats, N/V. Says feels OK.     Hx 57 yo CC: pain and swelling L knee. Slipped at work. Increasing pain. Surgery showed purulent fluid. Believes infected though no growth on culture. No previous knee issues. Pt had purulent drainage from knee. Taken back to surgery .   OBJECTIVE   VITALS    height is 1.727 m (5' 8\") and weight is 86.2 kg (190 lb). His oral temperature is 98.3 °F (36.8 °C). His blood pressure is 127/84 and his pulse is 81. His respiration is 16 and oxygen saturation is 96%.       Wt Readings from Last 3 Encounters:   24 86.2 kg (190 lb)   22 84.6 kg (186 lb 6.4 oz)   21 85 kg (187 lb 4.8 oz)       I/O (24 Hours)    Intake/Output Summary (Last 24 hours) at 2024 0808  Last data filed at 2024 0744  Gross per 24 hour   Intake 880 ml   Output 2045 ml   Net -1165 ml         General Appearance  Awake, alert, oriented,  In no acute distress  HEENT - normocephalic, atraumatic, pink conjunctiva,  anicteric sclera  Neck - Supple, no mass  Lungs -  Bilateral good air entry, no rhonchi, no wheeze  Cardiovascular - Heart sounds are normal.  Regular

## 2024-04-19 NOTE — PROGRESS NOTES
Orthopaedic Progress Note      SUBJECTIVE   Mr. Howell is post op day # 1 repeat ID L knee    Seen at bedside this morning  no adverse events overnight  Pain control tenuous  To mobilize with therapy today  Denies any numbness/paresthesia in operative extremity        OBJECTIVE      Physical    VITALS:  /84   Pulse 81   Temp 98.3 °F (36.8 °C) (Oral)   Resp 16   Ht 1.727 m (5' 8\")   Wt 86.2 kg (190 lb)   SpO2 96%   BMI 28.89 kg/m²   I/O last 3 completed shifts:  In: 1080 [P.O.:680; I.V.:400]  Out: 39271 [Urine:39923; Drains:130; Blood:50]    7/10 pain  Gen: alert and oriented  Head: normorcephalic, atraumatic  Resp: unlabored, room air  Pelvis: stable  LLE: no drainage, no bandage saturation  drain intact x1   Sensation to light touch intact  Gastroc TA EHL intact  Distal pulses palpable, warm well perfused  Calf supple nontender to palpation       Data  CBC:   Lab Results   Component Value Date/Time    WBC 14.9 04/19/2024 05:09 AM    HGB 11.2 04/19/2024 05:09 AM     04/19/2024 05:09 AM     BMP:    Lab Results   Component Value Date/Time     04/19/2024 05:09 AM    K 4.7 04/19/2024 05:09 AM    K 4.6 04/15/2024 05:31 AM     04/19/2024 05:09 AM    CO2 25 04/19/2024 05:09 AM    BUN 20 04/19/2024 05:09 AM    CREATININE 0.8 04/19/2024 05:09 AM    CALCIUM 8.0 04/19/2024 05:09 AM    GLUCOSE 162 04/19/2024 05:09 AM     Uric Acid:  No components found for: \"URIC\"  PT/INR:    Lab Results   Component Value Date/Time    INR 1.26 04/17/2024 12:51 AM     Troponin:  No results found for: \"TROPONINI\"  Urine Culture:  No components found for: \"CURINE\"      Current Inpatient Medications    Current Facility-Administered Medications: ceFAZolin (ANCEF) 2,000 mg in sterile water 20 mL IV syringe, 2,000 mg, IntraVENous, Q8H  sennosides-docusate sodium (SENOKOT-S) 8.6-50 MG tablet 2 tablet, 2 tablet, Oral, Daily  calcium carbonate (TUMS) chewable tablet 500 mg, 500 mg, Oral, TID PRN  sodium chloride flush

## 2024-04-19 NOTE — ANESTHESIA POSTPROCEDURE EVALUATION
Department of Anesthesiology  Postprocedure Note    Patient: Fran Howell  MRN: [id number]  YOB: 1965  Date of evaluation: 4/18/2024    Procedure Summary       Date: 04/18/24 Room / Location: [facility] OR [address] [place] [address] OR    Anesthesia Start: 1841 Anesthesia Stop: 1941    Procedure: Left Knee I&D (Left: Knee) Diagnosis:       Septic arthritis, due to unspecified organism, septic arthritis of unspecified location (HCC)      (Septic arthritis, due to unspecified organism, septic arthritis of unspecified location (HCC) [M00.9])    Surgeons: Frederick Chadwick DO Responsible Provider: Ankit Patel DO    Anesthesia Type: general ASA Status: 3            Anesthesia Type: No value filed.    Madeline Phase I: Madeline Score: 9    Madeline Phase II:      Anesthesia Post Evaluation    Patient location during evaluation: PACU  Patient participation: complete - patient participated  Level of consciousness: awake and alert  Pain score: 4  Airway patency: patent  Nausea & Vomiting: no nausea and no vomiting  Cardiovascular status: hemodynamically stable and blood pressure returned to baseline  Respiratory status: spontaneous ventilation, room air and acceptable  Hydration status: stable  Pain management: adequate and satisfactory to patient    No notable events documented.

## 2024-04-19 NOTE — PROGRESS NOTES
Care plan reviewed with patient. Patient verbalize understanding of the plan of care and contribute to goal setting.

## 2024-04-19 NOTE — PROGRESS NOTES
Hospitalist Progress Note    Patient:  Fran Howell    YOB: 1965  Unit/Bed:7K-10/010-A  Date of Admission: 4/12/2024  Code Status: Full Code      Assessment/Plan:    L knee septic arthritis:   Received Vanc 4/13-4/14, ceftriaxone 4/13-4/16.  Joint aspirated 4/16, high PMN and TNC count.   S/p I&D 4/16. Drain in place.  Culture negative; however, given appearance of fluid, plan to treat as septic arthritis. Pt did receive ABX prior to culture being taken.   Continue Ancef (start date 4/18).  S/p repeat I&D 4/18.   Ortho & ID following.    Discussed with Ortho - plan to remove Hemovac tomorrow.    Acute LLE DVT: Doppler US with acute DVT of single peroneal vein in the left calf. Pt placed on heparin gtt on arrival, plan for DOAC at dc for 3-6 months.   Transition to Eliquis today, 4/19.     Leukocytosis: secondary to #1. Abx as above. Daily CBC. Improving.     Thrombocytosis: likely reactive. Daily CBC.     Nausea/vomiting (POA), resolved     Constipation: Add Senna-S, Glycolax       LDA: []CVC / []PICC / []Midline / []Camacho / []Drains / []Mediport / [x]None  Antibiotics: ceftriaxone  Labs (still needed?): [x]Yes / []No  IVF (still needed?): []Yes / [x]No    Level of care: [x]Step Down / []Med-Surg  Bed Status: [x]Inpatient / []Observation  Telemetry: []Yes / [x]No  PT/OT: [x]Yes / []No    DVT Prophylaxis: [] Lovenox / [x] Heparin / [] SCDs / [] Already on Systemic Anticoagulation / [] None     Chief Complaint: left leg pain     HPI / Hospital Course: 58 y.o. male admitted to the hospitalist service for left leg pain. Pt reported injury to left leg on 4/11. Pt had been using a brace, but developed swelling and could no longer fit in his brace. He had wrapped his LLE with coban, noticed discoloration of toes after.   Doppler US with acute DVT of single peroneal vein in the left calf. Left knee XR 4/12 showed moderate joint knee effusion. Pt received IV Vanc 4/13-4/14, IV ceftriaxone    04/12/2024 11:12 PM      XR KNEE LEFT (MIN 4 VIEWS)   Final Result   Impression:   1. No acute osseous abnormality.   2. Moderate knee joint effusion.   3. Subcutaneous edema.      This document has been electronically signed by: Christian Olivas MD on    04/12/2024 11:15 PM        XR KNEE LEFT (MIN 4 VIEWS)    Result Date: 4/12/2024  Left knee x-ray: 4 views. Indication: Swelling and warmth. Fall a couple weeks ago. Comparison: None Findings: No acute fracture or dislocation. No periosteal reaction. 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 No significant arthritic changes. Normal alignment of the patella. Moderate suprapatellar joint effusion. Calf and thigh subcutaneous edema. No subcutaneous emphysema or radiopaque foreign body in the soft tissue.     Impression: 1. No acute osseous abnormality. 2. Moderate knee joint effusion. 3. Subcutaneous edema. This document has been electronically signed by: Christian Olivas MD on 04/12/2024 11:15 PM    XR TIBIA FIBULA LEFT (2 VIEWS)    Result Date: 4/12/2024  Left tibia and fibula x-ray: 2 views. Indication: Swelling, warmth, crunchiness. Comparison: None Findings: No acute fracture or dislocation involving tibia and fibula. Suprapatellar joint effusion. Subcutaneous edema in the thigh and calf. No subcutaneous emphysema or radiopaque foreign body in the soft tissue.     Impression: 1. No acute osseous abnormality. 2. Knee joint effusion. 3. Diffuse subcutaneous edema. This document has been electronically signed by: Christian Olivas MD on 04/12/2024 11:12 PM      Electronically signed by Susanne Lau PA-C on 4/19/2024 at 4:19 PM

## 2024-04-19 NOTE — PLAN OF CARE
Problem: Pain  Goal: Verbalizes/displays adequate comfort level or baseline comfort level  4/19/2024 1409 by Terrie Soto RN  Outcome: Progressing  Flowsheets (Taken 4/19/2024 1409)  Verbalizes/displays adequate comfort level or baseline comfort level:   Administer analgesics based on type and severity of pain and evaluate response   Implement non-pharmacological measures as appropriate and evaluate response     Problem: Safety - Adult  Goal: Free from fall injury  4/19/2024 1409 by Terrie Soto RN  Outcome: Progressing  Flowsheets (Taken 4/19/2024 1409)  Free From Fall Injury: Instruct family/caregiver on patient safety     Problem: Musculoskeletal - Adult  Goal: Return mobility to safest level of function  4/19/2024 1409 by Terrie Soto RN  Outcome: Progressing  Flowsheets (Taken 4/19/2024 1409)  Return Mobility to Safest Level of Function:   Assist with transfers and ambulation using safe patient handling equipment as needed   Ensure adequate protection for wounds/incisions during mobilization

## 2024-04-19 NOTE — PROGRESS NOTES
[facility]  [facility] 7K  Occupational Therapy  Daily Note  Time:   Time In: 1023  Time Out: 1055  Timed Code Treatment Minutes: 32 Minutes  Minutes: 32          Date: 2024  Patient Name: Fran Howell,   Gender: male      Room: [facility]10/010-A  MRN: [id number]  : 1965  (58 y.o.)  Referring Practitioner: Dilma Jaime PA-C  Diagnosis: left leg pain  Additional Pertinent Hx: The patient is a 58 y.o. male who presented to the [facility] emergency department on 2024 secondary to increasing left knee pain.  Patient describes an injury at work, this was on 2024.  He describes this as a twisting and hyperextension type mechanism.  He did go to an [facility] hospital following this injury where x-rays at that time showed an acute effusion of the knee.  He was evaluated in our office by Dr. Jackson and has been set up for an MRI for better evaluation as an outpatient.  Again presented on 2024 secondary to increasing pain which was really involving the entire left lower extremity.  He was provided a hinged knee brace but secondary to increased swelling this brace no longer fit and he took it upon himself to wrap the lower extremity and Coban from the toes to the thigh.  Upon presentation to the emergency department there was some redness to this left lower extremity and his D-dimer was significantly elevated.  X-rays continue to show effusion.  Seen in consultation today.  Patient notes some improvement in his pain to this left lower extremity.  He has been started on empiric antibiotics and was also started on heparin gtt. secondary to left lower extremity DVT.  . Septic arthritis, due to unspecified organism, septic arthritis of unspecified location. Joint aspirated, high PMN and TNC counts.  S/p I&D . Drain in place.    Restrictions/Precautions:  Restrictions/Precautions: General Precautions, Fall Risk, Weight Bearing  Left Lower Extremity Weight  Bearing: Weight Bearing As Tolerated  Position Activity Restriction  Other position/activity restrictions: drain     Social/Functional History:  Lives With: Spouse, Son  Type of Home: House  Home Layout: Two level, Able to Live on Main level with bedroom/bathroom, Performs ADL's on one level  Home Access: Stairs to enter without rails  Entrance Stairs - Number of Steps: 5 BRICE  Home Equipment: Hospital bed, Rollator   Bathroom Shower/Tub: Tub/Shower unit  Bathroom Equipment: Shower chair       ADL Assistance: Independent  Homemaking Assistance: Independent  Ambulation Assistance: Independent  Transfer Assistance: Independent    Active : Yes  Occupation: Full time employment  Type of Occupation:   Additional Comments: IND and active prior, wife works as well, son also works and is in school    SUBJECTIVE: RN okayed OT session.  Pt. In room and agreeable to participate after encouragement to attempt to get OOB.  Ortho PA aided in assuring Pt. Ok to get OOB and mobilize.  Increased time spent Pt. Voicing concerns regarding recent line up of events prior to hospital stay, active listening provided.      PAIN: 4/10: LLE    Vitals: Nurse checked vitals prior to session    COGNITION: WFL    ADL:   Footwear Management:Dependent to don L sock  Pt. Educated on LHAE such as a reacher and sock aid and benefits for home management tasks along with LB dressing.  Pt. Willing to trial next session.     IADL:   Not Tested    BALANCE:  Sitting Balance:  Stand By Assistance. Seated on EOB  Standing Balance: Contact Guard Assistance. No LOB    BED MOBILITY:  Supine to Sit: Minimal Assistance to guide LLE OOB    TRANSFERS:  Sit to Stand:  Contact Guard Assistance.    Stand to Sit: Contact Guard Assistance.      FUNCTIONAL MOBILITY:  Assistive Device: Rolling Walker  Assist Level:  Contact Guard Assistance.   Distance: Household distances within unit          Modified [place] Scale:  Not Applicable    ASSESSMENT:

## 2024-04-19 NOTE — CARE COORDINATION
4/19/24, 3:41 PM EDT    Patient goals/plan/ treatment preferences discussed by  and .  Patient goals/plan/ treatment preferences reviewed with patient/ family.  Patient/ family verbalize understanding of discharge plan and are in agreement with goal/plan/treatment preferences.  Understanding was demonstrated using the teach back method.  AVS provided by RN at time of discharge, which includes all necessary medical information pertaining to the patients current course of illness, treatment, post-discharge goals of care, and treatment preferences.     Services At/After Discharge: Outpatient     Plans home possibly this weekend; declined HH and has needed DME.    His sister Jolie is assisting him with getting a hospital bed thru [employer]. Declined other needs at discharge.

## 2024-04-19 NOTE — PLAN OF CARE
Problem: Discharge Planning  Goal: Discharge to home or other facility with appropriate resources  Outcome: Progressing  Flowsheets (Taken 4/18/2024 2018)  Discharge to home or other facility with appropriate resources: Identify barriers to discharge with patient and caregiver     Problem: Pain  Goal: Verbalizes/displays adequate comfort level or baseline comfort level  Outcome: Progressing  Flowsheets (Taken 4/17/2024 2000 by Twila Aly, RN)  Verbalizes/displays adequate comfort level or baseline comfort level: Encourage patient to monitor pain and request assistance     Problem: Safety - Adult  Goal: Free from fall injury  Outcome: Progressing  Flowsheets (Taken 4/17/2024 1733 by Nona Mccain LPN)  Free From Fall Injury:   Instruct family/caregiver on patient safety   Based on caregiver fall risk screen, instruct family/caregiver to ask for assistance with transferring infant if caregiver noted to have fall risk factors     Problem: Musculoskeletal - Adult  Goal: Return mobility to safest level of function  Outcome: Progressing  Flowsheets (Taken 4/17/2024 1733 by Nona Mccain LPN)  Return Mobility to Safest Level of Function:   Assess patient stability and activity tolerance for standing, transferring and ambulating with or without assistive devices   Assist with transfers and ambulation using safe patient handling equipment as needed     Problem: Gastrointestinal - Adult  Goal: Minimal or absence of nausea and vomiting  Outcome: Progressing  Flowsheets (Taken 4/17/2024 2000 by Twila Aly, RN)  Minimal or absence of nausea and vomiting: Administer ordered antiemetic medications as needed     Problem: Genitourinary - Adult  Goal: Absence of urinary retention  Outcome: Progressing  Flowsheets (Taken 4/17/2024 1733 by Nona Mccain LPN)  Absence of urinary retention: Assess patient’s ability to void and empty bladder     Problem: ABCDS Injury Assessment  Goal: Absence of physical

## 2024-04-19 NOTE — PROGRESS NOTES
[facility]  INPATIENT PHYSICAL THERAPY  DAILY NOTE  [facility] ORTHOPEDICS 7K - 7K-10/010-A    Time In: 1340  Time Out: 1405  Timed Code Treatment Minutes: 25 Minutes  Minutes: 25          Date: 2024  Patient Name: Fran Howell,  Gender:  male        MRN: [id number]  : 1965  (58 y.o.)     Referring Practitioner: Dilma Jaime PA-C  Diagnosis: left leg pain  Additional Pertinent Hx: Per EMR \"Fran Howell is a 58 y.o. male with no PMHx who presents to [facility] with left leg pain.  Patient states that he injured his leg last Thursday and he wrapped his leg, but today the pain has become unbearable. He is not able to ambulate well at home. He also developed new nausea and vomiting over the last couple of days.      Noted by ED staff that patient's left leg was wrapped with coban as the patient did have a brace for his left leg, but his leg was so swollen he couldn't fit it on the leg. When they assessed the left leg it was noted to be mottled and his toes were blue.     Denies fevers, chills, rigors, chest pain, and shortness of breath  \" Pt is s/p Left Knee Incision and Drainage completed by Dr Chadwick on .     Prior Level of Function:  Lives With: Spouse, Son  Type of Home: House  Home Layout: Two level, Able to Live on Main level with bedroom/bathroom, Performs ADL's on one level  Home Access: Stairs to enter without rails  Entrance Stairs - Number of Steps: 5 BRICE  Home Equipment: Hospital bed, Rollator   Bathroom Shower/Tub: Tub/Shower unit  Bathroom Equipment: Shower chair    ADL Assistance: Independent  Homemaking Assistance: Independent  Ambulation Assistance: Independent  Transfer Assistance: Independent  Active : Yes  Additional Comments: IND and active prior, wife works as well, son also works and is in school    Restrictions/Precautions:  Restrictions/Precautions: General Precautions, Fall Risk, Weight Bearing  Left Lower Extremity Weight  Bearing: Weight Bearing As Tolerated  Position Activity Restriction  Other position/activity restrictions: drain     SUBJECTIVE: Patient relaxing in recliner upon arrival, agreeable to therapy    PAIN: 5/10: L knee    Vitals: Vitals not assessed per clinical judgement, see nursing flowsheet    OBJECTIVE:  Bed Mobility:  Not Tested    Transfers:  Sit to Stand: Contact Guard Assistance, with increased time for completion, cues for hand placement  Stand to Sit:Stand By Assistance, Contact Guard Assistance, with increased time for completion, cues for hand placement    Ambulation:  Stand By Assistance, Contact Guard Assistance, with increased time for completion  Distance: 100 ft  Surface: Level Tile  Device:Rolling Walker  Gait Deviations:  Forward Flexed Posture, Slow Reyna, Decreased Step Length on Right, Decreased Gait Speed, Decreased Heel Strike Bilaterally, Mild Path Deviations, and Decreased Terminal Knee Extension    Balance:  Static Sitting Balance:  Supervision  Dynamic Sitting Balance: Stand By Assistance  Static Standing Balance: Contact Guard Assistance  Dynamic Standing Balance: Contact Guard Assistance, unilateral UE support with intermittent unilateral UE support       Functional Outcome Measures: Completed  AM-PAC Inpatient Mobility without Stair Climbing Raw Score : 15  AM-PAC Inpatient without Stair Climbing T-Scale Score : 43.03  Modified Priti Scale:  Not Applicable    ASSESSMENT:  Assessment: Patient progressing toward established goals.  Activity Tolerance:  Patient tolerance of  treatment: good.      Equipment Recommendations:Equipment Needed: No  Discharge Recommendations: Home with Assist as Needed, Home with Outpatient PT, and Patient would benefit from continued PT at discharge  Plan: Current Treatment Recommendations: Strengthening, Balance training, Gait training, Functional mobility training, Transfer training, Neuromuscular re-education, Stair training, Safety education & training,  Home exercise program, Therapeutic activities, Patient/Caregiver education & training, Equipment evaluation, education, & procurement, Endurance training  General Plan:  (5x O)    Education  Education:  Learners: Patient  Patient Education: Plan of Care, Transfers, Gait    Goals:  Patient Goals : return home with wife and family  Short Term Goals  Time Frame for Short Term Goals: by discharge  Short Term Goal 1: Pt will demo sit to/from stand transfers with IND with RW to return home safely.  Short Term Goal 2: Pt will demo IND with bed mobility tasks with bed flat to progress with mobility.  Short Term Goal 3: Pt will demo S for gait for >150 feet with RW to progress with  mobility.  Short Term Goal 4: Pt will demo SBA for stair negotiation with no rail and LRAD to return home safely.  Long Term Goals  Time Frame for Long Term Goals : NA due to short ELOS    Following session, patient left in safe position with all fall risk precautions in place.

## 2024-04-19 NOTE — OP NOTE
[address] [address]                            OPERATIVE REPORT      PATIENT NAME: MEHDI JETT               : 1965  MED REC NO: [id number]                       ROOM: 7K-10  ACCOUNT NO: [id number]                       ADMIT DATE: 2024  PROVIDER: Frederick Chadwick DO      DATE OF PROCEDURE:  2024    SURGEON:  Frederick Chadwick DO    PREOPERATIVE DIAGNOSIS:  Persistent left knee septic arthritis.    POSTOPERATIVE DIAGNOSIS:  Persistent left knee septic arthritis.    OPERATION PERFORMED:  Repeat arthrotomy and irrigation of left knee joint.    ASSISTANT:  Johan Nowak PA-C.  He assisted in positioning, retraction, closure, and dressing application.    TYPE OF ANESTHESIA:  General.    BLOOD LOSS:  Less than 50 mL.    SPECIMENS:  Tissue was sent for culture.    INDICATION FOR OPERATION:  The patient is a 58-year-old male who had an aspiration concerning for a septic knee a couple days prior.  He underwent an urgent irrigation and debridement of the joint.  The patient had already been on antibiotics.  Although cultures were negative, intraoperative findings were consistent with infection.  Following day, he had rather clear drainage from the surgical drain.  Today, it was noted that this drainage appeared to be more purulent in nature.  The patient's white blood cell count was still elevated at 21.  I therefore recommended repeat I and D.  Risks, benefits, and alternatives were reviewed.  The patient elected to proceed.    OPERATIVE SUMMARY:  The patient was met in the preoperative holding area, and the correct extremity was identified and marked.  Informed consent was obtained.  The patient was escorted to the operative suite, and general anesthesia was administered.  He was prepped and draped in the standard surgical fashion.  Time-out was taken.  Correct patient, procedure, and operative site were agreed upon

## 2024-04-20 VITALS
BODY MASS INDEX: 28.79 KG/M2 | HEART RATE: 90 BPM | OXYGEN SATURATION: 96 % | SYSTOLIC BLOOD PRESSURE: 125 MMHG | TEMPERATURE: 97.3 F | RESPIRATION RATE: 16 BRPM | HEIGHT: 68 IN | DIASTOLIC BLOOD PRESSURE: 80 MMHG | WEIGHT: 190 LBS

## 2024-04-20 LAB
ANION GAP SERPL CALC-SCNC: 10 MEQ/L (ref 8–16)
BACTERIA SPEC AEROBE CULT: NORMAL
BACTERIA SPEC ANAEROBE CULT: NORMAL
BUN SERPL-MCNC: 18 MG/DL (ref 7–22)
CALCIUM SERPL-MCNC: 8.4 MG/DL (ref 8.5–10.5)
CHLORIDE SERPL-SCNC: 100 MEQ/L (ref 98–111)
CO2 SERPL-SCNC: 28 MEQ/L (ref 23–33)
CREAT SERPL-MCNC: 0.8 MG/DL (ref 0.4–1.2)
CRP SERPL-MCNC: 4.83 MG/DL (ref 0–1)
DEPRECATED RDW RBC AUTO: 51.1 FL (ref 35–45)
ERYTHROCYTE [DISTWIDTH] IN BLOOD BY AUTOMATED COUNT: 13.9 % (ref 11.5–14.5)
GFR SERPL CREATININE-BSD FRML MDRD: > 90 ML/MIN/1.73M2
GLUCOSE SERPL-MCNC: 100 MG/DL (ref 70–108)
GRAM STN SPEC: NORMAL
HCT VFR BLD AUTO: 37 % (ref 42–52)
HGB BLD-MCNC: 11.9 GM/DL (ref 14–18)
MCH RBC QN AUTO: 32.2 PG (ref 26–33)
MCHC RBC AUTO-ENTMCNC: 32.2 GM/DL (ref 32.2–35.5)
MCV RBC AUTO: 100.3 FL (ref 80–94)
PLATELET # BLD AUTO: 617 THOU/MM3 (ref 130–400)
PMV BLD AUTO: 8.9 FL (ref 9.4–12.4)
POTASSIUM SERPL-SCNC: 4.6 MEQ/L (ref 3.5–5.2)
RBC # BLD AUTO: 3.69 MILL/MM3 (ref 4.7–6.1)
SODIUM SERPL-SCNC: 138 MEQ/L (ref 135–145)
WBC # BLD AUTO: 13.7 THOU/MM3 (ref 4.8–10.8)

## 2024-04-20 PROCEDURE — 6360000002 HC RX W HCPCS: Performed by: PHYSICIAN ASSISTANT

## 2024-04-20 PROCEDURE — 99239 HOSP IP/OBS DSCHRG MGMT >30: CPT | Performed by: PHYSICIAN ASSISTANT

## 2024-04-20 PROCEDURE — 36415 COLL VENOUS BLD VENIPUNCTURE: CPT

## 2024-04-20 PROCEDURE — 80048 BASIC METABOLIC PNL TOTAL CA: CPT

## 2024-04-20 PROCEDURE — 6370000000 HC RX 637 (ALT 250 FOR IP): Performed by: PHYSICIAN ASSISTANT

## 2024-04-20 PROCEDURE — 86140 C-REACTIVE PROTEIN: CPT

## 2024-04-20 PROCEDURE — 2580000003 HC RX 258: Performed by: PHYSICIAN ASSISTANT

## 2024-04-20 PROCEDURE — 85027 COMPLETE CBC AUTOMATED: CPT

## 2024-04-20 RX ORDER — CEPHALEXIN 500 MG/1
500 CAPSULE ORAL 3 TIMES DAILY
Qty: 42 CAPSULE | Refills: 0 | Status: SHIPPED | OUTPATIENT
Start: 2024-04-20 | End: 2024-05-04

## 2024-04-20 RX ORDER — HYDROCODONE BITARTRATE AND ACETAMINOPHEN 5; 325 MG/1; MG/1
1 TABLET ORAL EVERY 6 HOURS PRN
Qty: 12 TABLET | Refills: 0 | Status: SHIPPED | OUTPATIENT
Start: 2024-04-20 | End: 2024-04-23

## 2024-04-20 RX ADMIN — HYDROCODONE BITARTRATE AND ACETAMINOPHEN 2 TABLET: 5; 325 TABLET ORAL at 03:52

## 2024-04-20 RX ADMIN — WATER 2000 MG: 1 INJECTION INTRAMUSCULAR; INTRAVENOUS; SUBCUTANEOUS at 04:03

## 2024-04-20 RX ADMIN — APIXABAN 10 MG: 5 TABLET, FILM COATED ORAL at 10:13

## 2024-04-20 RX ADMIN — HYDROCODONE BITARTRATE AND ACETAMINOPHEN 2 TABLET: 5; 325 TABLET ORAL at 10:13

## 2024-04-20 RX ADMIN — DOCUSATE SODIUM 50 MG AND SENNOSIDES 8.6 MG 2 TABLET: 8.6; 5 TABLET, FILM COATED ORAL at 10:13

## 2024-04-20 RX ADMIN — SODIUM CHLORIDE, PRESERVATIVE FREE 10 ML: 5 INJECTION INTRAVENOUS at 10:13

## 2024-04-20 RX ADMIN — WATER 2000 MG: 1 INJECTION INTRAMUSCULAR; INTRAVENOUS; SUBCUTANEOUS at 11:53

## 2024-04-20 ASSESSMENT — PAIN - FUNCTIONAL ASSESSMENT
PAIN_FUNCTIONAL_ASSESSMENT: ACTIVITIES ARE NOT PREVENTED
PAIN_FUNCTIONAL_ASSESSMENT: PREVENTS OR INTERFERES SOME ACTIVE ACTIVITIES AND ADLS

## 2024-04-20 ASSESSMENT — PAIN SCALES - GENERAL
PAINLEVEL_OUTOF10: 2
PAINLEVEL_OUTOF10: 7
PAINLEVEL_OUTOF10: 7
PAINLEVEL_OUTOF10: 4

## 2024-04-20 ASSESSMENT — PAIN DESCRIPTION - FREQUENCY: FREQUENCY: CONTINUOUS

## 2024-04-20 ASSESSMENT — PAIN DESCRIPTION - ORIENTATION
ORIENTATION: LEFT
ORIENTATION: LEFT

## 2024-04-20 ASSESSMENT — PAIN DESCRIPTION - LOCATION
LOCATION: KNEE
LOCATION: KNEE

## 2024-04-20 ASSESSMENT — PAIN DESCRIPTION - DESCRIPTORS
DESCRIPTORS: ACHING;SHARP
DESCRIPTORS: ACHING

## 2024-04-20 ASSESSMENT — PAIN DESCRIPTION - ONSET: ONSET: ON-GOING

## 2024-04-20 ASSESSMENT — PAIN DESCRIPTION - PAIN TYPE: TYPE: SURGICAL PAIN

## 2024-04-20 NOTE — PROGRESS NOTES
Subjective:   Pain improved. Denies numbness or tingling. Afebrile. Hb 11.9. WBC 13.7. CRP 4.83.    Objective:   /84   Pulse 96   Temp 98.8 °F (37.1 °C) (Oral)   Resp 16   Ht 1.727 m (5' 8\")   Wt 86.2 kg (190 lb)   SpO2 92%   BMI 28.89 kg/m²     Recent Labs     04/18/24  0632 04/19/24  0509 04/20/24  0601   WBC 21.9* 14.9* 13.7*   HGB 11.8* 11.2* 11.9*   HCT 35.6* 34.1* 37.0*   * 552* 617*   CRP 11.15*  --  4.83*       Current Facility-Administered Medications: apixaban (ELIQUIS) tablet 10 mg, 10 mg, Oral, BID **FOLLOWED BY** [START ON 4/26/2024] apixaban (ELIQUIS) tablet 5 mg, 5 mg, Oral, BID  ceFAZolin (ANCEF) 2,000 mg in sterile water 20 mL IV syringe, 2,000 mg, IntraVENous, Q8H  sennosides-docusate sodium (SENOKOT-S) 8.6-50 MG tablet 2 tablet, 2 tablet, Oral, Daily  calcium carbonate (TUMS) chewable tablet 500 mg, 500 mg, Oral, TID PRN  sodium chloride flush 0.9 % injection 5-40 mL, 5-40 mL, IntraVENous, 2 times per day  sodium chloride flush 0.9 % injection 5-40 mL, 5-40 mL, IntraVENous, PRN  0.9 % sodium chloride infusion, , IntraVENous, PRN  potassium chloride (KLOR-CON M) extended release tablet 40 mEq, 40 mEq, Oral, PRN **OR** potassium bicarb-citric acid (EFFER-K) effervescent tablet 40 mEq, 40 mEq, Oral, PRN **OR** potassium chloride 10 mEq/100 mL IVPB (Peripheral Line), 10 mEq, IntraVENous, PRN  magnesium sulfate 2000 mg in 50 mL IVPB premix, 2,000 mg, IntraVENous, PRN  ondansetron (ZOFRAN-ODT) disintegrating tablet 4 mg, 4 mg, Oral, Q8H PRN **OR** ondansetron (ZOFRAN) injection 4 mg, 4 mg, IntraVENous, Q6H PRN  polyethylene glycol (GLYCOLAX) packet 17 g, 17 g, Oral, Daily PRN  acetaminophen (TYLENOL) tablet 650 mg, 650 mg, Oral, Q6H PRN **OR** acetaminophen (TYLENOL) suppository 650 mg, 650 mg, Rectal, Q6H PRN  morphine (PF) injection 1 mg, 1 mg, IntraVENous, Q2H PRN **OR** morphine (PF) injection 2 mg, 2 mg, IntraVENous, Q2H PRN  diphenhydrAMINE (BENADRYL) tablet 25 mg, 25 mg, Oral,  Q6H PRN  HYDROcodone-acetaminophen (NORCO) 5-325 MG per tablet 1 tablet, 1 tablet, Oral, Q4H PRN **OR** HYDROcodone-acetaminophen (NORCO) 5-325 MG per tablet 2 tablet, 2 tablet, Oral, Q4H PRN  heparin (porcine) injection 6,900 Units, 80 Units/kg, IntraVENous, PRN  heparin (porcine) injection 3,450 Units, 40 Units/kg, IntraVENous, PRN      Exam:  Gen: NAD  LLE: TTP to knee much improved. Sutures in place. Minimal drainage noted to drain site. Drain in place. Minimal output at this time. TA/EHL/FHL/GS motor intact. DP/SP/S SILT.       Assessment:  58 y.o. male POD 2 s/p repeat L knee I&D    Plan:  Pain control  Activity: WBAT LLE  Drain removed today. Sutures to incision remain in place. Soft dressing placed using 4x4's, Kerlix. Reinforce/change prn saturation.  PT/OT  IM primary  DVT ppx: Per primary  DC planning: OK to DC from Ortho standpoint. Recommend transition to oral abx at DC, will defer to ID.    Ortho to sign off. Do not hesitate to contact Dr Chadwick with any questions.    Electronically signed by Johan Nowak PA-C on 4/20/2024 at 7:21 AM

## 2024-04-20 NOTE — DISCHARGE SUMMARY
Hospitalist Discharge Summary    Patient: Fran Howell  YOB: 1965  MRN: [id number]   Acct: [id number]    Primary Care Physician: Phi Cueto MD    Admit date  4/12/2024    Discharge date: 4/20/2024     Chief Complaint on presentation: left leg pain     Initial H&P / Hospital Course:   58 y.o. male admitted to the hospitalist service for left leg pain. Pt reported injury to left leg on 4/11. Pt had been using a brace, but developed swelling and could no longer fit in his brace. He had wrapped his LLE with coban, noticed discoloration of toes after.   Doppler US with acute DVT of single peroneal vein in the left calf. Left knee XR 4/12 showed moderate joint knee effusion. Patient was started on heparin gtt for DVT. Transitioned to Eliquis for discharge.   Pt received IV Vanc 4/13-4/14, IV ceftriaxone 4/13-4/16. Ortho performed arthrocentesis 4/16 of left knee effusion with purulent drainage noted. High PMN and TNC count. Pt went to OR for I&D and drain placement 4/16.   ID consulted 4/17 as leukocytosis continued to worsen. Culture negative; however, given appearance of fluid, plan to treat as septic arthritis. Ancef added 4/18. Hemovac with pus draining, went for repeat I&D on 4/18.   Drain output minimal, drain removed on 4/19. Ok for dc from Ortho and ID standpoint. Will continue with PO Keflex for 14 days. Follow up with Ortho and PCP.      Patient responded well to medical management. Consultants had signed off or were contacted and agree with discharge plan. The patient was discharged in stable condition with appropriate outpatient follow up arranged.      Discharge Assessment and Plan:    L knee septic arthritis: continue with PO Keflex for 14 days  Acute LLE DVT: Doppler US with acute DVT of single peroneal vein in the left calf. Continue Eliquis at discharge. Patient educated on risk for bleeding. Follow up with PCP, can likely come off anticoagulation in 3 months.   Leukocytosis,  duplex lower extremity venous left   Final Result   1. Acute deep venous thrombosis limited to a single peroneal vein in the left calf.   2. Moderate size suprapatellar seroma or joint effusion.            **This report has been created using voice recognition software.  It may contain minor errors which are inherent in voice recognition technology.**      Final report electronically signed by Dr. Aman Jarrett on 4/15/2024 8:45 AM      XR TIBIA FIBULA LEFT (2 VIEWS)   Final Result   Impression:   1. No acute osseous abnormality.   2. Knee joint effusion.   3. Diffuse subcutaneous edema.      This document has been electronically signed by: Christian Olivas MD on    04/12/2024 11:12 PM      XR KNEE LEFT (MIN 4 VIEWS)   Final Result   Impression:   1. No acute osseous abnormality.   2. Moderate knee joint effusion.   3. Subcutaneous edema.      This document has been electronically signed by: Christian Olivas MD on    04/12/2024 11:15 PM          Consults:   IP CONSULT TO PHARMACY  IP CONSULT TO ORTHOPEDIC SURGERY  IP CONSULT TO INFECTIOUS DISEASES    Discharge Medications:      Medication List        START taking these medications      apixaban 5 MG Tabs tablet  Commonly known as: ELIQUIS  Take 2 tablets by mouth 2 times daily for 6 days, THEN 1 tablet 2 times daily for 24 days.  Start taking on: April 19, 2024     cephALEXin 500 MG capsule  Commonly known as: KEFLEX  Take 1 capsule by mouth 3 times daily for 14 days            CONTINUE taking these medications      acetaminophen 325 MG tablet  Commonly known as: TYLENOL     HYDROcodone-acetaminophen 5-325 MG per tablet  Commonly known as: NORCO  Take 1 tablet by mouth every 6 hours as needed for Pain for up to 3 days. Max Daily Amount: 4 tablets     ibuprofen 200 MG tablet  Commonly known as: ADVIL;MOTRIN               Where to Get Your Medications        These medications were sent to [facility] - [place], [place] - [address] [place] [address] -

## 2024-04-20 NOTE — PLAN OF CARE
Problem: Discharge Planning  Goal: Discharge to home or other facility with appropriate resources  4/20/2024 1137 by Nona Arceo LPN  Outcome: Completed     Problem: Pain  Goal: Verbalizes/displays adequate comfort level or baseline comfort level  4/20/2024 1137 by Nona Arceo LPN  Outcome: Completed     Problem: Skin/Tissue Integrity  Goal: Absence of new skin breakdown  Description: 1.  Monitor for areas of redness and/or skin breakdown  2.  Assess vascular access sites hourly  3.  Every 4-6 hours minimum:  Change oxygen saturation probe site  4.  Every 4-6 hours:  If on nasal continuous positive airway pressure, respiratory therapy assess nares and determine need for appliance change or resting period.  4/20/2024 1137 by Nona Arceo LPN  Outcome: Completed     Problem: Safety - Adult  Goal: Free from fall injury  4/20/2024 1137 by Nona Arceo LPN  Outcome: Completed     Problem: Musculoskeletal - Adult  Goal: Return mobility to safest level of function  4/20/2024 1137 by Nona Arceo LPN  Outcome: Completed     Problem: Gastrointestinal - Adult  Goal: Minimal or absence of nausea and vomiting  4/20/2024 1137 by Nona Arceo LPN  Outcome: Completed     Problem: Genitourinary - Adult  Goal: Absence of urinary retention  4/20/2024 1137 by Nona Arceo LPN  Outcome: Completed     Problem: Infection - Adult  Goal: Absence of infection at discharge  Outcome: Completed     Problem: ABCDS Injury Assessment  Goal: Absence of physical injury  Outcome: Completed

## 2024-04-20 NOTE — PLAN OF CARE
Problem: Discharge Planning  Goal: Discharge to home or other facility with appropriate resources  Outcome: Progressing     Problem: Pain  Goal: Verbalizes/displays adequate comfort level or baseline comfort level  4/20/2024 0115 by Monika Artis RN  Outcome: Progressing  Flowsheets (Taken 4/19/2024 2022)  Verbalizes/displays adequate comfort level or baseline comfort level:   Encourage patient to monitor pain and request assistance   Assess pain using appropriate pain scale   Administer analgesics based on type and severity of pain and evaluate response     Problem: Skin/Tissue Integrity  Goal: Absence of new skin breakdown  Description: 1.  Monitor for areas of redness and/or skin breakdown  2.  Assess vascular access sites hourly  3.  Every 4-6 hours minimum:  Change oxygen saturation probe site  4.  Every 4-6 hours:  If on nasal continuous positive airway pressure, respiratory therapy assess nares and determine need for appliance change or resting period.  Outcome: Progressing     Problem: Safety - Adult  Goal: Free from fall injury  4/20/2024 0115 by Monika Artis, RN  Outcome: Progressing     Problem: Musculoskeletal - Adult  Goal: Return mobility to safest level of function  4/20/2024 0115 by Monika Artis RN  Outcome: Progressing     Problem: Gastrointestinal - Adult  Goal: Minimal or absence of nausea and vomiting  Outcome: Progressing     Problem: Genitourinary - Adult  Goal: Absence of urinary retention  Outcome: Progressing

## 2024-04-21 LAB
BACTERIA SPEC AEROBE CULT: NORMAL
BACTERIA SPEC ANAEROBE CULT: NORMAL
BACTERIA SPEC ANAEROBE CULT: NORMAL
BACTERIA SPEC BFLD CULT: NORMAL
GRAM STN SPEC: NORMAL
GRAM STN SPEC: NORMAL

## 2024-04-22 ENCOUNTER — TELEPHONE (OUTPATIENT)
Dept: FAMILY MEDICINE CLINIC | Age: 59
End: 2024-04-22

## 2024-04-22 LAB
FUNGUS SPEC CULT: NORMAL
FUNGUS SPEC FUNGUS STN: NORMAL

## 2024-04-22 NOTE — TELEPHONE ENCOUNTER
[facility] Transitions Initial Follow Up Call    Outreach made within 2 business days of discharge: Yes    Patient: Fran Howell Patient : 1965   MRN: [id number]  Reason for Admission: There are no discharge diagnoses documented for the most recent discharge.  Discharge Date: 24       Spoke with: Fran    Discharge department/facility: [facility]    TCM Interactive Patient Contact:  Was patient able to fill all prescriptions: Yes  Was patient instructed to bring all medications to the follow-up visit: Yes  Is patient taking all medications as directed in the discharge summary? Yes  Does patient understand their discharge instructions: Yes  Does patient have questions or concerns that need addressed prior to 7-14 day follow up office visit: yes - pt concerned about swelling in his left ankle that doesn't seem to be improving.    Does not want to schedule HFU.    Scheduled appointment with PCP within 7-14 days    Follow Up  No future appointments.    Lucretia Cespedes MA

## 2024-04-23 LAB
BACTERIA SPEC AEROBE CULT: NORMAL
BACTERIA SPEC ANAEROBE CULT: NORMAL
GRAM STN SPEC: NORMAL

## 2024-04-24 ENCOUNTER — OFFICE VISIT (OUTPATIENT)
Dept: FAMILY MEDICINE CLINIC | Age: 59
End: 2024-04-24

## 2024-04-24 VITALS
HEART RATE: 93 BPM | WEIGHT: 188 LBS | DIASTOLIC BLOOD PRESSURE: 60 MMHG | HEIGHT: 68 IN | RESPIRATION RATE: 16 BRPM | TEMPERATURE: 98.6 F | SYSTOLIC BLOOD PRESSURE: 106 MMHG | BODY MASS INDEX: 28.49 KG/M2

## 2024-04-24 DIAGNOSIS — I82.462 ACUTE DEEP VEIN THROMBOSIS (DVT) OF CALF MUSCLE VEIN OF LEFT LOWER EXTREMITY (HCC): Primary | ICD-10-CM

## 2024-04-24 DIAGNOSIS — R73.09 ELEVATED GLUCOSE: ICD-10-CM

## 2024-04-24 DIAGNOSIS — Z12.5 PROSTATE CANCER SCREENING: ICD-10-CM

## 2024-04-24 DIAGNOSIS — M00.862 ARTHRITIS OF LEFT KNEE DUE TO OTHER BACTERIA (HCC): ICD-10-CM

## 2024-04-24 DIAGNOSIS — F17.200 SMOKING: ICD-10-CM

## 2024-04-24 DIAGNOSIS — Z09 HOSPITAL DISCHARGE FOLLOW-UP: ICD-10-CM

## 2024-04-24 PROBLEM — M79.605 LEFT LEG PAIN: Status: RESOLVED | Noted: 2024-04-13 | Resolved: 2024-04-24

## 2024-04-24 PROBLEM — L98.9 SCALP LESION: Status: RESOLVED | Noted: 2022-06-07 | Resolved: 2024-04-24

## 2024-04-24 PROBLEM — M00.9 ARTHRITIS, SEPTIC (HCC): Status: ACTIVE | Noted: 2024-04-24

## 2024-04-24 RX ORDER — HYDROCODONE BITARTRATE AND ACETAMINOPHEN 5; 325 MG/1; MG/1
1 TABLET ORAL EVERY 6 HOURS PRN
COMMUNITY

## 2024-04-24 SDOH — ECONOMIC STABILITY: HOUSING INSECURITY
IN THE LAST 12 MONTHS, WAS THERE A TIME WHEN YOU DID NOT HAVE A STEADY PLACE TO SLEEP OR SLEPT IN A SHELTER (INCLUDING NOW)?: NO

## 2024-04-24 SDOH — ECONOMIC STABILITY: FOOD INSECURITY: WITHIN THE PAST 12 MONTHS, YOU WORRIED THAT YOUR FOOD WOULD RUN OUT BEFORE YOU GOT MONEY TO BUY MORE.: NEVER TRUE

## 2024-04-24 SDOH — ECONOMIC STABILITY: INCOME INSECURITY: HOW HARD IS IT FOR YOU TO PAY FOR THE VERY BASICS LIKE FOOD, HOUSING, MEDICAL CARE, AND HEATING?: NOT HARD AT ALL

## 2024-04-24 SDOH — ECONOMIC STABILITY: FOOD INSECURITY: WITHIN THE PAST 12 MONTHS, THE FOOD YOU BOUGHT JUST DIDN'T LAST AND YOU DIDN'T HAVE MONEY TO GET MORE.: NEVER TRUE

## 2024-04-24 ASSESSMENT — PATIENT HEALTH QUESTIONNAIRE - PHQ9
SUM OF ALL RESPONSES TO PHQ QUESTIONS 1-9: 0
SUM OF ALL RESPONSES TO PHQ9 QUESTIONS 1 & 2: 0
SUM OF ALL RESPONSES TO PHQ QUESTIONS 1-9: 0
1. LITTLE INTEREST OR PLEASURE IN DOING THINGS: NOT AT ALL
SUM OF ALL RESPONSES TO PHQ QUESTIONS 1-9: 0
SUM OF ALL RESPONSES TO PHQ QUESTIONS 1-9: 0
2. FEELING DOWN, DEPRESSED OR HOPELESS: NOT AT ALL

## 2024-04-24 ASSESSMENT — ENCOUNTER SYMPTOMS
SHORTNESS OF BREATH: 0
WHEEZING: 0

## 2024-04-24 NOTE — PROGRESS NOTES
present    Patient Active Problem List   Diagnosis    Scalp lesion    Left leg pain    Acute deep vein thrombosis (DVT) of calf muscle vein of left lower extremity (HCC)       Medications listed as ordered at the time of discharge from hospital     Medication List            Accurate as of April 24, 2024  8:00 AM. If you have any questions, ask your nurse or doctor.                CONTINUE taking these medications      acetaminophen 325 MG tablet  Commonly known as: TYLENOL     apixaban 5 MG Tabs tablet  Commonly known as: ELIQUIS  Take 2 tablets by mouth 2 times daily for 6 days, THEN 1 tablet 2 times daily for 24 days.  Start taking on: April 19, 2024     cephALEXin 500 MG capsule  Commonly known as: KEFLEX  Take 1 capsule by mouth 3 times daily for 14 days     HYDROcodone-acetaminophen 5-325 MG per tablet  Commonly known as: NORCO     ibuprofen 200 MG tablet  Commonly known as: ADVIL;MOTRIN               Medications marked \"taking\" at this time  Outpatient Medications Marked as Taking for the 4/24/24 encounter (Office Visit) with Phi Cueto MD   Medication Sig Dispense Refill    HYDROcodone-acetaminophen (NORCO) 5-325 MG per tablet Take 1 tablet by mouth every 6 hours as needed for Pain. Max Daily Amount: 4 tablets      cephALEXin (KEFLEX) 500 MG capsule Take 1 capsule by mouth 3 times daily for 14 days 42 capsule 0    apixaban (ELIQUIS) 5 MG TABS tablet Take 2 tablets by mouth 2 times daily for 6 days, THEN 1 tablet 2 times daily for 24 days. 72 tablet 0    ibuprofen (ADVIL;MOTRIN) 200 MG tablet Take 1 tablet by mouth every 6 hours as needed for Pain          Medications patient taking as of now reconciled against medications ordered at time of hospital discharge: Yes    Review of Systems   Constitutional:  Positive for chills. Negative for fever.   Respiratory:  Negative for shortness of breath and wheezing.    Cardiovascular:  Negative for chest pain.   Musculoskeletal:  Positive for arthralgias, gait

## 2024-04-30 NOTE — PROGRESS NOTES
Physician Progress Note      PATIENT:               MEHDI JETT  [facility] #:                  [id number]  :                       1965  ADMIT DATE:       2024 9:37 PM  DISCH DATE:        2024 2:30 PM  RESPONDING  PROVIDER #:        Susanne Lau PA-C          QUERY TEXT:    Pt admitted with L knee septic arthritis. Pt noted to have WBC 26.9, CRP   11.15, Temp 101.8, , Procalcitonin 0.60. If possible, please document in   the progress notes and discharge summary if you are evaluating and /or   treating any of the following:    The medical record reflects the following:  Risk Factors: septic arthritis, Cellulitis,  Clinical Indicators: IM PN  L knee septic arthritis: Joint aspirated ,   high PMN and TNC count. S/p I&D . Drain in place.  Culture negative; however, given appearance of fluid, plan to treat as septic   arthritis. Pt did receive ABX prior to culture being taken.  S/p repeat I&D . Ortho & ID following.  WBC-11.5,14.8,14.5,20.3,21.0,26.9,22.6,26.3,21.9,14.9  Procalcitonin-0.60  CRP-11.15,4.83  Temp-101.8, 100.7, 101.8  HR-98,94,101,98,104,109, 114,103, 104  Treatment: IV Cefazolin, IV Ceftriaxone, IV vancomycin, IVF, ID consulted,   Serial labs    ThanksSienna -   Options provided:  -- Sepsis due to L knee septic arthritis, present on admission  -- L knee septic arthritis without Sepsis  -- Other - I will add my own diagnosis  -- Disagree - Not applicable / Not valid  -- Disagree - Clinically unable to determine / Unknown  -- Refer to Clinical Documentation Reviewer    PROVIDER RESPONSE TEXT:    This patient has sepsis due to L knee septic arthritis which was present on   admission.    Query created by: Sienna Carter on 2024 9:57 AM      Electronically signed by:  Susanne Lau PA-C 2024 8:29 AM

## 2024-05-20 LAB
FUNGUS SPEC CULT: NORMAL
FUNGUS SPEC FUNGUS STN: NORMAL

## 2024-06-18 DIAGNOSIS — I82.462 ACUTE DEEP VEIN THROMBOSIS (DVT) OF CALF MUSCLE VEIN OF LEFT LOWER EXTREMITY (HCC): ICD-10-CM

## 2024-06-18 NOTE — TELEPHONE ENCOUNTER
Fran Howell called requesting a refill on the following medications:  Requested Prescriptions     Pending Prescriptions Disp Refills    apixaban (ELIQUIS) 5 MG TABS tablet 60 tablet 2     Sig: Take 1 tablet by mouth 2 times daily       Date of last visit: 4/24/2024  Date of next visit (if applicable):Visit date not found  Date of last refill: 04/24/2024  Pharmacy Name: [facility],  Hannah Dwyer LPN

## (undated) DEVICE — SET UP: Brand: MEDLINE INDUSTRIES, INC.

## (undated) DEVICE — KIT EVAC 400CC PVC RADPQ Y CONN

## (undated) DEVICE — PENCIL SMK EVAC ALL IN 1 DSGN ENH VISIBILITY IMPROVED AIR

## (undated) DEVICE — DRAPE,U/SHT,SPLIT,FILM,60X84,STERILE: Brand: MEDLINE

## (undated) DEVICE — SPONGE LAP W18XL18IN WHT COT 4 PLY FLD STRUNG RADPQ DISP ST 2 PER PACK

## (undated) DEVICE — SPONGE GZ W4XL4IN COT 12 PLY TYP VII WVN C FLD DSGN STERILE

## (undated) DEVICE — SUTURE MONOCRYL SZ 2-0 L27IN ABSRB UD SH L26MM TAPERPOINT NDL Y417H

## (undated) DEVICE — APPLICATOR MEDICATED 26 CC SOLUTION HI LT ORNG CHLORAPREP

## (undated) DEVICE — BANDAGE COMPR SGL LAYERED CLP CLSR E 33FT LEN 4IN W TETRA

## (undated) DEVICE — 450 ML BOTTLE OF 0.05% CHLORHEXIDINE GLUCONATE IN 99.95% STERILE WATER FOR IRRIGATION, USP AND APPLICATOR.: Brand: IRRISEPT ANTIMICROBIAL WOUND LAVAGE

## (undated) DEVICE — LIQUIBAND RAPID ADHESIVE 36/CS 0.8ML: Brand: MEDLINE

## (undated) DEVICE — DRESSING,GAUZE,XEROFORM,CURAD,5"X9",ST: Brand: CURAD

## (undated) DEVICE — SOLUTION IRRIG 3000ML 0.9% SOD CHL USP UROMATIC PLAS CONT

## (undated) DEVICE — GLOVE ORANGE PI 8   MSG9080

## (undated) DEVICE — SUTURE ETHILON SZ 3-0 L18IN NONABSORBABLE BLK L24MM FS-1 3/8 663G

## (undated) DEVICE — BANDAGE,GAUZE,4.5"X4.1YD,STERILE,LF: Brand: MEDLINE

## (undated) DEVICE — DRAIN SURG 15FR 3/16IN MID PERF TRCR SIL RND TB HUBLESS NO

## (undated) DEVICE — DRAPE,EXTREMITY,89X128,STERILE: Brand: MEDLINE

## (undated) DEVICE — GLOVE SURG SZ 75 L12IN THK75MIL DK GRN LTX FREE

## (undated) DEVICE — GLOVE ORANGE PI 7 1/2   MSG9075

## (undated) DEVICE — BANDAGE COBAN 4 IN COMPR W4INXL5YD FOAM COHESIVE QUIK STK SELF ADH SFT

## (undated) DEVICE — SET IRRIG L94IN ID0.281IN W/ 4.5IN DST FLX CONN 2 LD ON OFF